# Patient Record
Sex: MALE | Race: WHITE | NOT HISPANIC OR LATINO | Employment: FULL TIME | URBAN - METROPOLITAN AREA
[De-identification: names, ages, dates, MRNs, and addresses within clinical notes are randomized per-mention and may not be internally consistent; named-entity substitution may affect disease eponyms.]

---

## 2017-03-30 ENCOUNTER — APPOINTMENT (OUTPATIENT)
Dept: GENERAL RADIOLOGY | Facility: CLINIC | Age: 32
End: 2017-03-30
Attending: EMERGENCY MEDICINE
Payer: COMMERCIAL

## 2017-03-30 ENCOUNTER — HOSPITAL ENCOUNTER (EMERGENCY)
Facility: CLINIC | Age: 32
Discharge: HOME | End: 2017-03-30
Attending: EMERGENCY MEDICINE | Admitting: EMERGENCY MEDICINE
Payer: COMMERCIAL

## 2017-03-30 ENCOUNTER — PRE VISIT (OUTPATIENT)
Dept: ORTHOPEDICS | Facility: CLINIC | Age: 32
End: 2017-03-30

## 2017-03-30 VITALS
TEMPERATURE: 98.4 F | DIASTOLIC BLOOD PRESSURE: 85 MMHG | HEART RATE: 78 BPM | BODY MASS INDEX: 28.46 KG/M2 | WEIGHT: 240 LBS | RESPIRATION RATE: 16 BRPM | SYSTOLIC BLOOD PRESSURE: 135 MMHG | OXYGEN SATURATION: 99 %

## 2017-03-30 DIAGNOSIS — S52.125A CLOSED NONDISPLACED FRACTURE OF HEAD OF LEFT RADIUS, INITIAL ENCOUNTER: ICD-10-CM

## 2017-03-30 PROCEDURE — 73090 X-RAY EXAM OF FOREARM: CPT | Mod: LT

## 2017-03-30 PROCEDURE — 99284 EMERGENCY DEPT VISIT MOD MDM: CPT

## 2017-03-30 PROCEDURE — 99284 EMERGENCY DEPT VISIT MOD MDM: CPT | Mod: Z6 | Performed by: EMERGENCY MEDICINE

## 2017-03-30 PROCEDURE — 73060 X-RAY EXAM OF HUMERUS: CPT | Mod: LT

## 2017-03-30 PROCEDURE — 73080 X-RAY EXAM OF ELBOW: CPT | Mod: LT

## 2017-03-30 ASSESSMENT — ENCOUNTER SYMPTOMS
ABDOMINAL PAIN: 0
NUMBNESS: 0
WEAKNESS: 0
ARTHRALGIAS: 0
SHORTNESS OF BREATH: 0
FEVER: 0
WOUND: 0

## 2017-03-30 NOTE — ED AVS SNAPSHOT
[facility], [place], Emergency Department    [address]    [place] [address]    Phone:  [phone]    Fax:  [phone]                                       Michael Darden   MRN: [id number]    Department:  [facility], Emergency Department   Date of Visit:  3/30/2017           After Visit Summary Signature Page     I have received my discharge instructions, and my questions have been answered. I have discussed any challenges I see with this plan with the nurse or doctor.    ..........................................................................................................................................  Patient/Patient Representative Signature      ..........................................................................................................................................  Patient Representative Print Name and Relationship to Patient    ..................................................               ................................................  Date                                            Time    ..........................................................................................................................................  Reviewed by Signature/Title    ...................................................              ..............................................  Date                                                            Time

## 2017-03-30 NOTE — TELEPHONE ENCOUNTER
1.  Date/reason for appt: 4/6/17 11AM Closed nondisplaced fracture of head of left radius  2.  Referring provider: BELLA SWANSON   3.  Call to patient (Yes / No - short description): No, ED f/u   4.  Previous care at / records requested from:  ED note- 3/30/17  PACS- HUMERUS XR, Elbow XR & Radius/Ulna 3/30/17

## 2017-03-30 NOTE — DISCHARGE INSTRUCTIONS
Please make an appointment to follow up with Dr. Price in Orthopedics (phone: [phone]) in 1 week.  Wear sling for the next 5 days and after that begin gentle range of motion with flexing and extending the elbow.  Do not lift any weight greater than a glass of water for the next 6 weeks.          Upper Extremity Fracture    You have a break (fracture) of the arm, wrist, or hand. This may be a small crack in the bone. Or it may be a major break, with the broken parts pushed out of position. Most fractures will heal without surgery. But you may need surgery if the bones are far out of place or if the break is near the elbow. Treatment is with a special sling called a shoulder immobilizer, or a splint or cast, depending on where the fracture is. This fracture takes 4 to 6 weeks to heal. The cast may need to be changed in 2 to 3 weeks as swelling goes down.  Home care  Follow these guidelines when caring for yourself:    If you were given a shoulder immobilizer, leave it in place. This will support the injured arm at your side. This is the best position for bone healing. The shoulder immobilizer can be adjusted. If it becomes loose, adjust it so that your forearm is level with the ground (horizontal). Your hand should be level with your elbow.    Put an ice pack on the injured area. Do this for 20 minutes every 1 to 2 hours the first day for pain relief. You can make an ice pack by wrapping a plastic bag of ice cubes in a thin towel. As the ice melts, be careful that the cast/splint/sling doesn t get wet. You can put the ice pack inside the sling and directly over the splint or cast. Continue using the ice pack 3 to 4 times a day for the next 2 days. Then use the ice pack as needed to ease pain and swelling.    Keep the cast/splint/sling completely dry at all times. Bathe with your cast/splint/sling out of the water. Protect it with a large plastic bag, rubber-banded at the top end. If a fiberglass  cast/splint/sling gets wet, you can dry it with a hair dryer.    You may use acetaminophen or ibuprofen to control pain, unless another pain medicine was prescribed. If you have chronic liver or kidney disease, talk with your health care provider before using these medicines. Also talk with your provider if you ve had a stomach ulcer or GI bleeding.    Don t put creams or objects under the cast if you have itching.  Follow-up care  Follow up with your health care provider in 1 week, or as advised. This is to make sure the bone is healing the way it should.  If X-rays were taken, A radiologist will look at them. You will be told of any new findings that may affect your care.  When to seek medical advice  Call your health care provider right away if any of these occur:    The cast cracks    The plaster cast or splint becomes wet or soft    The fiberglass cast or splint stays wet for more than 24 hours    Bad odor from the cast or wound fluid stains the cast    Tightness or pain under the cast or splint gets worse    Fingers become swollen, cold, blue, numb, or tingly    You can t move your fingers    Skin around cast becomes red    Fever of 101 F (38.3 C) or higher, or as directed by your health care provider    1725-6751 [employer]. [address] [place] [address], [place] [address]. All rights reserved. This information is not intended as a substitute for professional medical care. Always follow your healthcare professional's instructions.

## 2017-03-30 NOTE — ED PROVIDER NOTES
History     Chief Complaint   Patient presents with     Arm Injury     Last night fell on the left arm. Pain to the elbow area extending both directions from there.  Not able to straighten the arm.     Patient is a 31 year old male presenting with arm injury.   Arm Injury   Associated symptoms: no fever      Michael Darden is a 31 year old right-hand-dominant male who presents with a left arm injury.  He reports he was playing around with some friends last night and tripped and fell landing on his left forearm and elbow.  He initially had pain at the forearm and had been icing it overnight however this morning noted that he cannot completely flex or extend at his elbow.  He denies any numbness, tingling or weakness in the arm.  He has not had any previous injury to that arm.  He denies hitting his head or any other injury or trauma from the fall itself.    I have reviewed the Medications, Allergies, Past Medical and Surgical History, and Social History in the Epic system.    Review of Systems   Constitutional: Negative for fever.   HENT: Negative for congestion.    Respiratory: Negative for shortness of breath.    Cardiovascular: Negative for chest pain.   Gastrointestinal: Negative for abdominal pain.   Musculoskeletal: Negative for arthralgias.   Skin: Negative for wound.   Neurological: Negative for weakness and numbness.       Physical Exam   BP: 123/78  Heart Rate: 72  Temp: 98  F (36.7  C)  Resp: 18  Weight: 108.9 kg (240 lb)  SpO2: 100 %  Physical Exam   General: patient is alert and oriented and in no acute distress   Head: atraumatic and normocephalic   EENT: moist mucus membranes, pupils round and reactive   Neck: supple   Cardiovascular: regular rate and rhythm, extremities warm and well perfused,  2+ radial pulses  Pulmonary: No respiratory distress  Musculoskeletal: No point bony tenderness of the left upper arm, elbow or forearm, able to extend the left elbow to approximately 10  short of full  extension and flex to approximately 45 , unable to fully supinate the left forearm, full range of motion of the shoulder, wrist and fingers on the left  Neurological: alert and oriented, moving all extremities symmetrically, gait normal, 5 out of 5  strength, finger abduction, wrist extension, elbow flexion and extension, sensation to light touch along the median, radial and ulnar nerves of the hand is intact  Skin: warm, dry, no abrasions or lacerations of the left arm      ED Course     ED Course     Procedures             Critical Care time:  none               Labs Ordered and Resulted from Time of ED Arrival Up to the Time of Departure from the ED - No data to display    Assessments & Plan (with Medical Decision Making)   Mr. Darden is a 31 year old right-hand-dominant male who presents with a left arm injury.  He is neurovascularly intact.  He does not have any point bony tenderness and does have fairly good range of motion though is not able to fully extend his elbow and is approximately 20  short of full extension.  Plain films are obtained of the left elbow, left forearm and left humerus which show impacted radial head fracture.  Discussed with orthopedic surgery and will place in sling with 1 week follow-up.  Instructed to wear sling for 5 days then gentle ROM and no weight bearing for 6 weeks.  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  Will discharge to home with return instructions.        I have reviewed the nursing notes.    I have reviewed the findings, diagnosis, plan and need for follow up with the patient.    There are no discharge medications for this patient.      Final diagnoses:   Closed nondisplaced fracture of head of left radius, initial encounter       3/30/2017   [facility], [place], EMERGENCY DEPARTMENT     Katherine Garrett MD  03/30/17 1355

## 2017-03-30 NOTE — ED AVS SNAPSHOT
[facility], Emergency Department    [address]    [place] [address]    Phone:  [phone]    Fax:  [phone]                                       Michael Darden   MRN: [id number]    Department:  [facility], Emergency Department   Date of Visit:  3/30/2017           Patient Information     Date Of Birth          1985        Your diagnoses for this visit were:     Closed nondisplaced fracture of head of left radius, initial encounter        You were seen by Katherine Garrett MD.        Discharge Instructions       Please make an appointment to follow up with Dr. Price in Orthopedics (phone: [phone]) in 1 week.  Wear sling for the next 5 days and after that begin gentle range of motion with flexing and extending the elbow.  Do not lift any weight greater than a glass of water for the next 6 weeks.          Upper Extremity Fracture    You have a break (fracture) of the arm, wrist, or hand. This may be a small crack in the bone. Or it may be a major break, with the broken parts pushed out of position. Most fractures will heal without surgery. But you may need surgery if the bones are far out of place or if the break is near the elbow. Treatment is with a special sling called a shoulder immobilizer, or a splint or cast, depending on where the fracture is. This fracture takes 4 to 6 weeks to heal. The cast may need to be changed in 2 to 3 weeks as swelling goes down.  Home care  Follow these guidelines when caring for yourself:    If you were given a shoulder immobilizer, leave it in place. This will support the injured arm at your side. This is the best position for bone healing. The shoulder immobilizer can be adjusted. If it becomes loose, adjust it so that your forearm is level with the ground (horizontal). Your hand should be level with your elbow.    Put an ice pack on the injured area. Do this for 20 minutes every 1 to 2 hours the first day for pain relief. You can make an  ice pack by wrapping a plastic bag of ice cubes in a thin towel. As the ice melts, be careful that the cast/splint/sling doesn t get wet. You can put the ice pack inside the sling and directly over the splint or cast. Continue using the ice pack 3 to 4 times a day for the next 2 days. Then use the ice pack as needed to ease pain and swelling.    Keep the cast/splint/sling completely dry at all times. Bathe with your cast/splint/sling out of the water. Protect it with a large plastic bag, rubber-banded at the top end. If a fiberglass cast/splint/sling gets wet, you can dry it with a hair dryer.    You may use acetaminophen or ibuprofen to control pain, unless another pain medicine was prescribed. If you have chronic liver or kidney disease, talk with your health care provider before using these medicines. Also talk with your provider if you ve had a stomach ulcer or GI bleeding.    Don t put creams or objects under the cast if you have itching.  Follow-up care  Follow up with your health care provider in 1 week, or as advised. This is to make sure the bone is healing the way it should.  If X-rays were taken, A radiologist will look at them. You will be told of any new findings that may affect your care.  When to seek medical advice  Call your health care provider right away if any of these occur:    The cast cracks    The plaster cast or splint becomes wet or soft    The fiberglass cast or splint stays wet for more than 24 hours    Bad odor from the cast or wound fluid stains the cast    Tightness or pain under the cast or splint gets worse    Fingers become swollen, cold, blue, numb, or tingly    You can t move your fingers    Skin around cast becomes red    Fever of 101 F (38.3 C) or higher, or as directed by your health care provider    7331-7458 [employer]. [address] [place] [address], [place] [address]. All rights reserved. This information is not intended as a substitute for professional medical care.  Always follow your healthcare professional's instructions.          24 Hour Appointment Hotline       To make an appointment at any [facility] clinic, call [phone] ([phone]). If you don't have a family doctor or clinic, we will help you find one. [facility] clinics are conveniently located to serve the needs of you and your family.             Review of your medicines      Notice     You have not been prescribed any medications.            Procedures and tests performed during your visit     Elbow  XR, G/E 3 views, left    Humerus XR,  G/E 2 views, left    Radius/Ulna XR,  PA &LAT, left      Orders Needing Specimen Collection     None      Pending Results     No orders found from 3/28/2017 to 3/31/2017.            Pending Culture Results     No orders found from 3/28/2017 to 3/31/2017.            Thank you for choosing [facility]       Thank you for choosing [facility] for your care. Our goal is always to provide you with excellent care. Hearing back from our patients is one way we can continue to improve our services. Please take a few minutes to complete the written survey that you may receive in the mail after you visit with us. Thank you!        [employer] Information     [employer] gives you secure access to your electronic health record. If you see a primary care provider, you can also send messages to your care team and make appointments. If you have questions, please call your primary care clinic.  If you do not have a primary care provider, please call [phone] and they will assist you.        Care EveryWhere ID     This is your Care EveryWhere ID. This could be used by other organizations to access your [facility] medical records  [id number]        After Visit Summary       This is your record. Keep this with you and show to your community pharmacist(s) and doctor(s) at your next visit.

## 2017-04-01 ASSESSMENT — ENCOUNTER SYMPTOMS
BACK PAIN: 0
JOINT SWELLING: 0
ARTHRALGIAS: 0
NECK PAIN: 0
MUSCLE WEAKNESS: 0
MYALGIAS: 0
MUSCLE CRAMPS: 0
STIFFNESS: 0

## 2017-04-06 ENCOUNTER — OFFICE VISIT (OUTPATIENT)
Dept: ORTHOPEDICS | Facility: CLINIC | Age: 32
End: 2017-04-06

## 2017-04-06 VITALS
DIASTOLIC BLOOD PRESSURE: 69 MMHG | BODY MASS INDEX: 27.66 KG/M2 | HEIGHT: 78 IN | WEIGHT: 239.1 LBS | HEART RATE: 58 BPM | SYSTOLIC BLOOD PRESSURE: 119 MMHG

## 2017-04-06 DIAGNOSIS — S52.125A CLOSED NONDISPLACED FRACTURE OF HEAD OF LEFT RADIUS, INITIAL ENCOUNTER: Primary | ICD-10-CM

## 2017-04-06 NOTE — NURSING NOTE
"Reason For Visit:   Chief Complaint   Patient presents with     Eval/Assessment     left elbow fx     Primary: none  Ref. 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  Date of injury: 3/29/17  Type of injury: a fall.  Date of surgery: none    Smoker: No    /69  Pulse 58  Ht 1.98 m (6' 5.95\")  Wt 108.5 kg (239 lb 1.6 oz)  BMI 27.66 kg/m2      Pain Assessment  Patient Currently in Pain: Yes  0-10 Pain Scale: 7  Primary Pain Location: Elbow  Pain Orientation: Left  Pain Descriptors: Aching  Alleviating Factors: Other (comment) (tylenol)  Aggravating Factors: Other (comment) (being unable to move)                                                 "

## 2017-04-06 NOTE — MR AVS SNAPSHOT
After Visit Summary   4/6/2017    Michael Darden    MRN: [id number]           Patient Information     Date Of Birth          1985        Visit Information        Provider Department      4/6/2017 11:00 AM Mary Gatica MD [facility]        Today's Diagnoses     Closed nondisplaced fracture of head of left radius, initial encounter    -  1       Follow-ups after your visit        Follow-up notes from your care team     Return in about 2 weeks (around 4/20/2017), or if symptoms worsen or fail to improve.      Who to contact     Please call your clinic at [phone] to:    Ask questions about your health    Make or cancel appointments    Discuss your medicines    Learn about your test results    Speak to your doctor   If you have compliments or concerns about an experience at your clinic, or if you wish to file a complaint, please contact [facility] Patient Relations at [phone] or email us at [email or web address]         Additional Information About Your Visit        MyChart Information     [employer] gives you secure access to your electronic health record. If you see a primary care provider, you can also send messages to your care team and make appointments. If you have questions, please call your primary care clinic.  If you do not have a primary care provider, please call [phone] and they will assist you.      [email or web address] is an electronic gateway that provides easy, online access to your medical records. With [email or web address], you can request a clinic appointment, read your test results, renew a prescription or communicate with your care team.     To access your existing account, please contact your [facility] Clinic or call [phone] for assistance.        Care EveryWhere ID     This is your Care EveryWhere ID. This could be used by other organizations to access your [facility]  "records  [id number]        Your Vitals Were     Pulse Height BMI (Body Mass Index)             58 6' 5.95\" (1.98 m) 27.66 kg/m2          Blood Pressure from Last 3 Encounters:   04/06/17 119/69   03/30/17 135/85   10/11/16 138/85    Weight from Last 3 Encounters:   04/06/17 239 lb 1.6 oz (108.5 kg)   03/30/17 240 lb (108.9 kg)   10/11/16 246 lb 9.6 oz (111.9 kg)               Primary Care Provider    Physician No Ref-Primary       No address on file        Thank you!     Thank you for choosing [facility]  for your care. Our goal is always to provide you with excellent care. Hearing back from our patients is one way we can continue to improve our services. Please take a few minutes to complete the written survey that you may receive in the mail after your visit with us. Thank you!             Your Updated Medication List - Protect others around you: Learn how to safely use, store and throw away your medicines at www.disposemymeds.org.          This list is accurate as of: 4/6/17 12:05 PM.  Always use your most recent med list.                   Brand Name Dispense Instructions for use    TYLENOL EXTRA STRENGTH PO      Take 2 tablets by mouth 2 times daily         "

## 2017-04-06 NOTE — LETTER
"4/6/2017       RE: Michael Darden  [address] [place] [address]     Dear Colleague,    Thank you for referring your patient, Michael Darden, to the [facility] at [facility]. Please see a copy of my visit note below.    SUBJECTIVE: Michael Darden is a right handed 31 year old male who has a left elbow fracture.  Pt was running outside at night, tripped and went down.  Iced.  Woke up in the a.m. And went to the ER.  Told to get f/u pictures and keep the arm in a sling.  Aches constantly.  Keeping it in the sling.  Went down onto left elbow and went down onto cement.  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    PAST MEDICAL, SOCIAL, SURGICAL AND FAMILY HISTORY: He  has no past medical history on file.  He  has a past surgical history that includes deviated septum.  His family history includes CANCER in his mother; Coronary Artery Disease in his father.  He reports that he has never smoked. He has never used smokeless tobacco. He reports that he drinks alcohol. He reports that he does not use illicit drugs.      ALLERGIES: He has No Known Allergies.    CURRENT MEDICATIONS: He has a current medication list which includes the following prescription(s): acetaminophen.     REVIEW OF SYSTEMS: 10 point review of systems is negative except as noted above.    EXAM:  /69  Pulse 58  Ht 6' 5.95\" (1.98 m)  Wt 239 lb 1.6 oz (108.5 kg)  BMI 27.66 kg/m2  CONSTITUTIONIAL: healthy, alert, no distress, cooperative and over weight  HEAD: Normocephalic. No masses, lesions, tenderness or abnormalities  ENT: ENT exam normal, no neck nodes or sinus tenderness  SKIN: no suspicious lesions or rashes  GAIT: normal  Stance: normal  NEUROLOGIC: Normal muscle tone and strength, reflexes normal, sensation grossly normal.  PSYCHIATRIC: affect normal/bright and mentation appears normal.    MUSCULOSKELETAL: left elbow pain  Inspection: no swelling, no ecchymosis, no distal " bicep tendon defect  Tender: radial head/neck  Non-tender: lateral epicondyle, common extensor tendon, medial epicondyle, common flexor tendon, extensor muscle of forearm and flexor muscle of forearm  Range of Motion: supination:  Lacks 10 degrees degrees, pronation: normal  Strength: elbow strength mildly decreased on left  Special tests: normal stability        ASSESSMENT/PLAN:  Pt is a 32 yo white male with PMHx of deviated septum repair presenting with left elbow injury after a fall  1. Left elbow radial head fracture- sling, gentle ROM, return to clinic in 2 weeks, re-x-ray left elbow 3v    X-RAY INTERPRETATION:   X-Ray of the Left Elbow: 3-view, ap, lateral, oblique:  ordered and interpreted in the office today was positive for IMPRESSION: Redemonstration of a subtle nondisplaced fracture  involving the left elbow radial head.      Again, thank you for allowing me to participate in the care of your patient.      Sincerely,    Mary Gatica MD

## 2017-04-06 NOTE — PROGRESS NOTES
"SUBJECTIVE: Michael Darden is a right handed 31 year old male who has a left elbow fracture.  Pt was running outside at night, tripped and went down.  Iced.  Woke up in the a.m. And went to the ER.  Told to get f/u pictures and keep the arm in a sling.  Aches constantly.  Keeping it in the sling.  Went down onto left elbow and went down onto cement.  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    PAST MEDICAL, SOCIAL, SURGICAL AND FAMILY HISTORY: He  has no past medical history on file.  He  has a past surgical history that includes deviated septum.  His family history includes CANCER in his mother; Coronary Artery Disease in his father.  He reports that he has never smoked. He has never used smokeless tobacco. He reports that he drinks alcohol. He reports that he does not use illicit drugs.      ALLERGIES: He has No Known Allergies.    CURRENT MEDICATIONS: He has a current medication list which includes the following prescription(s): acetaminophen.     REVIEW OF SYSTEMS: 10 point review of systems is negative except as noted above.    EXAM:  /69  Pulse 58  Ht 6' 5.95\" (1.98 m)  Wt 239 lb 1.6 oz (108.5 kg)  BMI 27.66 kg/m2  CONSTITUTIONIAL: healthy, alert, no distress, cooperative and over weight  HEAD: Normocephalic. No masses, lesions, tenderness or abnormalities  ENT: ENT exam normal, no neck nodes or sinus tenderness  SKIN: no suspicious lesions or rashes  GAIT: normal  Stance: normal  NEUROLOGIC: Normal muscle tone and strength, reflexes normal, sensation grossly normal.  PSYCHIATRIC: affect normal/bright and mentation appears normal.    MUSCULOSKELETAL: left elbow pain  Inspection: no swelling, no ecchymosis, no distal bicep tendon defect  Tender: radial head/neck  Non-tender: lateral epicondyle, common extensor tendon, medial epicondyle, common flexor tendon, extensor muscle of forearm and flexor muscle of forearm  Range of Motion: supination:  Lacks 10 degrees degrees, pronation: normal  Strength: " elbow strength mildly decreased on left  Special tests: normal stability        ASSESSMENT/PLAN:  Pt is a 32 yo white male with PMHx of deviated septum repair presenting with left elbow injury after a fall  1. Left elbow radial head fracture- sling, gentle ROM, return to clinic in 2 weeks, re-x-ray left elbow 3v    X-RAY INTERPRETATION:   X-Ray of the Left Elbow: 3-view, ap, lateral, oblique:  ordered and interpreted in the office today was positive for IMPRESSION: Redemonstration of a subtle nondisplaced fracture  involving the left elbow radial head.  Answers for HPI/ROS submitted by the patient on 4/1/2017   General Symptoms: No  Skin Symptoms: No  HENT Symptoms: No  EYE SYMPTOMS: No  HEART SYMPTOMS: No  LUNG SYMPTOMS: No  INTESTINAL SYMPTOMS: No  URINARY SYMPTOMS: No  REPRODUCTIVE SYMPTOMS: No  SKELETAL SYMPTOMS: Yes  BLOOD SYMPTOMS: No  NERVOUS SYSTEM SYMPTOMS: No  MENTAL HEALTH SYMPTOMS: No  Back pain: No  Muscle aches: No  Neck pain: No  Swollen joints: No  Joint pain: No  Bone pain: No  Muscle cramps: No  Muscle weakness: No  Joint stiffness: No  Bone fracture: Yes

## 2017-04-20 ENCOUNTER — OFFICE VISIT (OUTPATIENT)
Dept: ORTHOPEDICS | Facility: CLINIC | Age: 32
End: 2017-04-20

## 2017-04-20 VITALS
WEIGHT: 239.8 LBS | BODY MASS INDEX: 27.74 KG/M2 | RESPIRATION RATE: 18 BRPM | DIASTOLIC BLOOD PRESSURE: 76 MMHG | HEART RATE: 69 BPM | SYSTOLIC BLOOD PRESSURE: 121 MMHG | HEIGHT: 78 IN | OXYGEN SATURATION: 98 %

## 2017-04-20 DIAGNOSIS — S52.125K CLOSED NONDISPLACED FRACTURE OF HEAD OF LEFT RADIUS WITH NONUNION, SUBSEQUENT ENCOUNTER: Primary | ICD-10-CM

## 2017-04-20 DIAGNOSIS — S52.125A CLOSED NONDISPLACED FRACTURE OF HEAD OF LEFT RADIUS: Primary | ICD-10-CM

## 2017-04-20 NOTE — MR AVS SNAPSHOT
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      Who to contact     Please call your clinic at [phone] to:    Ask questions about your health    Make or cancel appointments    Discuss your medicines    Learn about your test results    Speak to your doctor   If you have compliments or concerns about an experience at your clinic, or if you wish to file a complaint, please contact [facility] Patient Relations at [phone] or email us at [email or web address]         Additional Information About Your Visit        MyChart Information     Pittarellot gives you secure access to your electronic health record. If you see a primary care provider, you can also send messages to your care team and make appointments. If you have questions, please call your primary care clinic.  If you do not have a primary care provider, please call [phone] and they will assist you.      LogicLadder is an electronic gateway that provides easy, online access to your medical records. With LogicLadder, you can request a clinic appointment, read your test results, renew a prescription or communicate with your care team.     To access your existing account, please contact your [facility] or call [phone] for assistance.        Care EveryWhere ID     This is your Care EveryWhere ID. This could be used by other organizations to access your [facility]  "records  [id number]        Your Vitals Were     Pulse Respirations Height Pulse Oximetry BMI (Body Mass Index)       69 18 1.981 m (6' 6\") 98% 27.71 kg/m2        Blood Pressure from Last 3 Encounters:   04/20/17 121/76   04/06/17 119/69   03/30/17 135/85    Weight from Last 3 Encounters:   04/20/17 108.8 kg (239 lb 12.8 oz)   04/06/17 108.5 kg (239 lb 1.6 oz)   03/30/17 108.9 kg (240 lb)              Today, you had the following     No orders found for display       Primary Care Provider    Physician No Ref-Primary       No address on file        Thank you!     Thank you for choosing [facility]  for your care. Our goal is always to provide you with excellent care. Hearing back from our patients is one way we can continue to improve our services. Please take a few minutes to complete the written survey that you may receive in the mail after your visit with us. Thank you!             Your Updated Medication List - Protect others around you: Learn how to safely use, store and throw away your medicines at www.disposemymeds.org.          This list is accurate as of: 4/20/17  1:48 PM.  Always use your most recent med list.                   Brand Name Dispense Instructions for use    TYLENOL EXTRA STRENGTH PO      Take 2 tablets by mouth 2 times daily         "

## 2017-04-20 NOTE — LETTER
"4/20/2017       RE: Michael Darden  [address] [place] [address]     Dear Colleague,    Thank you for referring your patient, Michael Darden, to the [facility] at [facility]. Please see a copy of my visit note below.    SUBJECTIVE: Michael Darden is a right handed 31 year old male who has a left elbow fracture.  Pt was running outside at night, tripped and went down.    Pt reports that he's doing well, out of the sling.  Pt reports that he really doesn't have much pain at the elbow.  Pt reports full pronation and supination, some loss coming up like making a bicep.  More of a stiffness at the elbow.    PAST MEDICAL, SOCIAL, SURGICAL AND FAMILY HISTORY: He  has no past medical history on file.  He  has a past surgical history that includes deviated septum.  His family history includes Breast Cancer in his mother; CANCER in his mother; Coronary Artery Disease in his father; Hypertension in his father.  He reports that he has never smoked. He has never used smokeless tobacco. He reports that he drinks alcohol. He reports that he does not use illicit drugs.      ALLERGIES: He has No Known Allergies.    CURRENT MEDICATIONS: He has a current medication list which includes the following prescription(s): acetaminophen.     REVIEW OF SYSTEMS: 10 point review of systems is negative except as noted above.    EXAM:  /76 (BP Location: Right arm, Patient Position: Chair, Cuff Size: Adult Large)  Pulse 69  Resp 18  Ht 1.981 m (6' 6\")  Wt 108.8 kg (239 lb 12.8 oz)  SpO2 98%  BMI 27.71 kg/m2  CONSTITUTIONIAL: healthy, alert, no distress, cooperative and over weight  HEAD: Normocephalic. No masses, lesions, tenderness or abnormalities  ENT: ENT exam normal, no neck nodes or sinus tenderness  SKIN: no suspicious lesions or rashes  GAIT: normal  Stance: normal  NEUROLOGIC: Normal muscle tone and strength, reflexes normal, sensation grossly " normal.  PSYCHIATRIC: affect normal/bright and mentation appears normal.    MUSCULOSKELETAL: left elbow pain  Inspection: no swelling, no ecchymosis, no distal bicep tendon defect  Tender: radial head/neck  Non-tender: lateral epicondyle, common extensor tendon, medial epicondyle, common flexor tendon, extensor muscle of forearm and flexor muscle of forearm  Range of Motion: supination:  Lacks 5 degrees degrees, pronation: normal, flexion: lacks 20 degrees  Strength: elbow strength mildly decreased on left  Special tests: normal stability        ASSESSMENT/PLAN:  Pt is a 30 yo white male with PMHx of deviated septum repair presenting with left elbow injury after a fall  1. Left elbow radial head fracture- out of sling, gentle ROM, return to clinic in 3 weeks, re-x-ray left elbow 3v    X-RAY INTERPRETATION:   X-Ray of the Left Elbow: 3-view, ap, lateral, oblique:  ordered and interpreted in the office today was positive for IMPRESSION: Redemonstration of a subtle nondisplaced fracture  involving the left elbow radial head.        Again, thank you for allowing me to participate in the care of your patient.      Sincerely,    Mary Gatica MD

## 2017-04-20 NOTE — PROGRESS NOTES
"SUBJECTIVE: Michael Darden is a right handed 31 year old male who has a left elbow fracture.  Pt was running outside at night, tripped and went down.    Pt reports that he's doing well, out of the sling.  Pt reports that he really doesn't have much pain at the elbow.  Pt reports full pronation and supination, some loss coming up like making a bicep.  More of a stiffness at the elbow.    PAST MEDICAL, SOCIAL, SURGICAL AND FAMILY HISTORY: He  has no past medical history on file.  He  has a past surgical history that includes deviated septum.  His family history includes Breast Cancer in his mother; CANCER in his mother; Coronary Artery Disease in his father; Hypertension in his father.  He reports that he has never smoked. He has never used smokeless tobacco. He reports that he drinks alcohol. He reports that he does not use illicit drugs.      ALLERGIES: He has No Known Allergies.    CURRENT MEDICATIONS: He has a current medication list which includes the following prescription(s): acetaminophen.     REVIEW OF SYSTEMS: 10 point review of systems is negative except as noted above.    EXAM:  /76 (BP Location: Right arm, Patient Position: Chair, Cuff Size: Adult Large)  Pulse 69  Resp 18  Ht 1.981 m (6' 6\")  Wt 108.8 kg (239 lb 12.8 oz)  SpO2 98%  BMI 27.71 kg/m2  CONSTITUTIONIAL: healthy, alert, no distress, cooperative and over weight  HEAD: Normocephalic. No masses, lesions, tenderness or abnormalities  ENT: ENT exam normal, no neck nodes or sinus tenderness  SKIN: no suspicious lesions or rashes  GAIT: normal  Stance: normal  NEUROLOGIC: Normal muscle tone and strength, reflexes normal, sensation grossly normal.  PSYCHIATRIC: affect normal/bright and mentation appears normal.    MUSCULOSKELETAL: left elbow pain  Inspection: no swelling, no ecchymosis, no distal bicep tendon defect  Tender: radial head/neck  Non-tender: lateral epicondyle, common extensor tendon, medial epicondyle, common flexor tendon, " extensor muscle of forearm and flexor muscle of forearm  Range of Motion: supination:  Lacks 5 degrees degrees, pronation: normal, flexion: lacks 20 degrees  Strength: elbow strength mildly decreased on left  Special tests: normal stability        ASSESSMENT/PLAN:  Pt is a 32 yo white male with PMHx of deviated septum repair presenting with left elbow injury after a fall  1. Left elbow radial head fracture- out of sling, gentle ROM, return to clinic in 3 weeks, re-x-ray left elbow 3v    X-RAY INTERPRETATION:   X-Ray of the Left Elbow: 3-view, ap, lateral, oblique:  ordered and interpreted in the office today was positive for IMPRESSION: Redemonstration of a subtle nondisplaced fracture  involving the left elbow radial head.    Answers for HPI/ROS submitted by the patient on 4/18/2017   General Symptoms: No  Skin Symptoms: No  HENT Symptoms: No  EYE SYMPTOMS: No  HEART SYMPTOMS: No  LUNG SYMPTOMS: No  INTESTINAL SYMPTOMS: No  URINARY SYMPTOMS: No  REPRODUCTIVE SYMPTOMS: No  SKELETAL SYMPTOMS: No  BLOOD SYMPTOMS: No  NERVOUS SYSTEM SYMPTOMS: No  MENTAL HEALTH SYMPTOMS: No

## 2017-04-20 NOTE — NURSING NOTE
"Reason For Visit:   Chief Complaint   Patient presents with     Elbow left     f/u left elbow fx, DOI: 3/29/2017       Primary: none  Ref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  Date of injury: 3/29/17  Type of injury: a fall.  Date of surgery: none  Smoker: No    /76 (BP Location: Right arm, Patient Position: Chair, Cuff Size: Adult Large)  Pulse 69  Resp 18  Ht 1.981 m (6' 6\")  Wt 108.8 kg (239 lb 12.8 oz)  SpO2 98%  BMI 27.71 kg/m2    Pain Assessment  Patient Currently in Pain: Yes  0-10 Pain Scale: 3  Primary Pain Location: Elbow  Pain Orientation: Left  Pain Descriptors: Aching  Alleviating Factors: Ice  Aggravating Factors: Other (comment) (\"over use\")    "

## 2018-10-31 ENCOUNTER — OFFICE VISIT (OUTPATIENT)
Dept: FAMILY MEDICINE | Facility: CLINIC | Age: 33
End: 2018-10-31
Payer: COMMERCIAL

## 2018-10-31 VITALS
OXYGEN SATURATION: 100 % | SYSTOLIC BLOOD PRESSURE: 110 MMHG | HEART RATE: 68 BPM | RESPIRATION RATE: 20 BRPM | WEIGHT: 242 LBS | HEIGHT: 78 IN | TEMPERATURE: 97.9 F | DIASTOLIC BLOOD PRESSURE: 71 MMHG | BODY MASS INDEX: 28 KG/M2

## 2018-10-31 DIAGNOSIS — B96.89 BACTERIAL CONJUNCTIVITIS OF BOTH EYES: ICD-10-CM

## 2018-10-31 DIAGNOSIS — J02.9 SORE THROAT: Primary | ICD-10-CM

## 2018-10-31 DIAGNOSIS — Z23 NEED FOR VACCINATION: ICD-10-CM

## 2018-10-31 DIAGNOSIS — H10.9 BACTERIAL CONJUNCTIVITIS OF BOTH EYES: ICD-10-CM

## 2018-10-31 DIAGNOSIS — J02.0 STREPTOCOCCAL SORE THROAT: ICD-10-CM

## 2018-10-31 LAB
DEPRECATED S PYO AG THROAT QL EIA: NORMAL
SPECIMEN SOURCE: NORMAL

## 2018-10-31 PROCEDURE — 99213 OFFICE O/P EST LOW 20 MIN: CPT | Mod: 25 | Performed by: NURSE PRACTITIONER

## 2018-10-31 PROCEDURE — 90686 IIV4 VACC NO PRSV 0.5 ML IM: CPT | Performed by: NURSE PRACTITIONER

## 2018-10-31 PROCEDURE — 87081 CULTURE SCREEN ONLY: CPT | Performed by: NURSE PRACTITIONER

## 2018-10-31 PROCEDURE — 90471 IMMUNIZATION ADMIN: CPT | Performed by: NURSE PRACTITIONER

## 2018-10-31 PROCEDURE — 87880 STREP A ASSAY W/OPTIC: CPT | Performed by: NURSE PRACTITIONER

## 2018-10-31 RX ORDER — POLYMYXIN B SULFATE AND TRIMETHOPRIM 1; 10000 MG/ML; [USP'U]/ML
1 SOLUTION OPHTHALMIC
Qty: 1 BOTTLE | Refills: 0 | Status: SHIPPED | OUTPATIENT
Start: 2018-10-31 | End: 2018-11-05

## 2018-10-31 NOTE — NURSING NOTE
Injectable Influenza Immunization Documentation    1.  Has the patient received the information for the injectable influenza vaccine? YES     2. Is the patient 6 months of age or older? YES     3. Does the patient have any of the following contraindications?         Severe allergy to eggs? No     Severe allergic reaction to previous influenza vaccines? No   Severe allergy to latex? No       History of Guillain-[place] syndrome? No     Currently have a temperature greater than 100.4F? No         Vaccination given by Phan Coon MA

## 2018-10-31 NOTE — MR AVS SNAPSHOT
"              After Visit Summary   10/31/2018    Michael Darden    MRN: [id number]           Patient Information     Date Of Birth          1985        Visit Information        Provider Department      10/31/2018 7:40 AM Morenita Sher APRN CNP [facility]        Today's Diagnoses     Sore throat    -  1    Streptococcal sore throat        Bacterial conjunctivitis of both eyes        Need for vaccination           Follow-ups after your visit        Who to contact     If you have questions or need follow up information about today's clinic visit or your schedule please contact [facility] directly at [phone].  Normal or non-critical lab and imaging results will be communicated to you by gBoxhart, letter or phone within 4 business days after the clinic has received the results. If you do not hear from us within 7 days, please contact the clinic through gBoxhart or phone. If you have a critical or abnormal lab result, we will notify you by phone as soon as possible.  Submit refill requests through SimilarWeb or call your pharmacy and they will forward the refill request to us. Please allow 3 business days for your refill to be completed.          Additional Information About Your Visit        MyChart Information     SimilarWeb gives you secure access to your electronic health record. If you see a primary care provider, you can also send messages to your care team and make appointments. If you have questions, please call your primary care clinic.  If you do not have a primary care provider, please call [phone] and they will assist you.        Care EveryWhere ID     This is your Care EveryWhere ID. This could be used by other organizations to access your [facility] medical records  [id number]        Your Vitals Were     Pulse Temperature Respirations Height Pulse Oximetry BMI (Body Mass Index)    68 97.9  F (36.6  C) (Oral) 20 6' 6\" (1.981 m) 100% 27.97 kg/m2       " Blood Pressure from Last 3 Encounters:   10/31/18 110/71   04/20/17 121/76   04/06/17 119/69    Weight from Last 3 Encounters:   10/31/18 242 lb (109.8 kg)   04/20/17 239 lb 12.8 oz (108.8 kg)   04/06/17 239 lb 1.6 oz (108.5 kg)              We Performed the Following     ADMIN 1st VACCINE     Beta strep group A culture     HC FLU VAC PRESRV FREE QUAD SPLIT VIR 3+YRS IM     Strep, Rapid Screen          Today's Medication Changes          These changes are accurate as of 10/31/18  9:08 AM.  If you have any questions, ask your nurse or doctor.               Start taking these medicines.        Dose/Directions    trimethoprim-polymyxin b ophthalmic solution   Commonly known as:  POLYTRIM   Used for:  Bacterial conjunctivitis of both eyes   Started by:  Morenita Sher APRN CNP        Dose:  1 drop   Apply 1 drop to eye every 3 hours for 7 days   Quantity:  1 Bottle   Refills:  0            Where to get your medicines      These medications were sent to [facility] - [place], [place] - [address]  [address], [place] [address]     Phone:  [phone]     trimethoprim-polymyxin b ophthalmic solution                Primary Care Provider Fax #    Physician No Ref-Primary [phone]       No address on file        Equal Access to Services     GERALD VALLECILLO AH: Humberto lizarragao Soomaali, waaxda luqadaha, qaybta kaalmada adeegyada, waxagnieszka preciado. So [facility] [phone].    ATENCIÓN: Si habla español, tiene a welsh disposición servicios gratuitos de asistencia lingüística. Llame al [phone].    We comply with applicable federal civil rights laws and [place] laws. We do not discriminate on the basis of race, color, national origin, age, disability, sex, sexual orientation, or gender identity.            Thank you!     Thank you for choosing [facility]  for your care. Our goal is always to provide you with excellent care. Hearing back from our  patients is one way we can continue to improve our services. Please take a few minutes to complete the written survey that you may receive in the mail after your visit with us. Thank you!             Your Updated Medication List - Protect others around you: Learn how to safely use, store and throw away your medicines at www.disposemymeds.org.          This list is accurate as of 10/31/18  9:08 AM.  Always use your most recent med list.                   Brand Name Dispense Instructions for use Diagnosis    trimethoprim-polymyxin b ophthalmic solution    POLYTRIM    1 Bottle    Apply 1 drop to eye every 3 hours for 7 days    Bacterial conjunctivitis of both eyes       TYLENOL EXTRA STRENGTH PO      Take 2 tablets by mouth 2 times daily

## 2018-10-31 NOTE — PROGRESS NOTES
"  SUBJECTIVE:   Michael Darden is a 33 year old male who presents to clinic today for the following health issues:      RESPIRATORY SYMPTOMS      Duration: three days     Description  sore throat and eye redness     Severity: moderate    Accompanying signs and symptoms: None    History (predisposing factors):  Child had hand foot mouth     Precipitating or alleviating factors: None    Therapies tried and outcome:  none    Reviewed and updated as needed this visit by clinical staff       Reviewed and updated as needed this visit by Provider         ROS:  Constitutional, HEENT, cardiovascular, pulmonary, GI, , musculoskeletal, neuro, skin, endocrine and psych systems are negative, except as otherwise noted.    OBJECTIVE:     /71  Pulse 68  Temp 97.9  F (36.6  C) (Oral)  Resp 20  Ht 6' 6\" (1.981 m)  Wt 242 lb (109.8 kg)  SpO2 100%  BMI 27.97 kg/m2  Body mass index is 27.97 kg/(m^2).  GENERAL: healthy, alert and no distress  EYES: Eyes grossly normal to inspection, PERRL and bilateral conjunctivae erythematous and injected with gold crusting in lashes   ENT: ear canals and TM's normal, nose and mouth without ulcers or lesions  NECK: no adenopathy, no asymmetry, masses, or scars and thyroid normal to palpation  RESP: lungs clear to auscultation - no rales, rhonchi or wheezes  CV: regular rate and rhythm, normal S1 S2, no S3 or S4, no murmur, click or rub, no peripheral edema and peripheral pulses strong  MS: no gross musculoskeletal defects noted, no edema  SKIN: no suspicious lesions or rashes    Diagnostic Test Results:  Results for orders placed or performed in visit on 10/31/18 (from the past 24 hour(s))   Strep, Rapid Screen   Result Value Ref Range    Specimen Description Throat     Rapid Strep A Screen       NEGATIVE: No Group A streptococcal antigen detected by immunoassay, await culture report.       ASSESSMENT/PLAN:       ICD-10-CM    1. Need for vaccination Z23 ADMIN 1st VACCINE     HC FLU VAC " PRESRV FREE QUAD SPLIT VIR 3+YRS IM   2. Streptococcal sore throat J02.0 Strep, Rapid Screen     Beta strep group A culture   3. Bacterial conjunctivitis of both eyes H10.9 trimethoprim-polymyxin b (POLYTRIM) ophthalmic solution     The RST was negative.  Culture pending.    push fluids, rest as able.  lots of handwashing.    Antibiotic drops per order. Hygiene discussed. Call prn    See Patient Instructions    CLEO Mane CNP  [facility]

## 2018-11-01 LAB
BACTERIA SPEC CULT: NORMAL
SPECIMEN SOURCE: NORMAL

## 2018-11-05 ENCOUNTER — OFFICE VISIT (OUTPATIENT)
Dept: FAMILY MEDICINE | Facility: CLINIC | Age: 33
End: 2018-11-05
Payer: COMMERCIAL

## 2018-11-05 VITALS — RESPIRATION RATE: 16 BRPM | HEART RATE: 68 BPM | WEIGHT: 242 LBS | BODY MASS INDEX: 27.97 KG/M2

## 2018-11-05 DIAGNOSIS — J06.9 UPPER RESPIRATORY TRACT INFECTION, UNSPECIFIED TYPE: Primary | ICD-10-CM

## 2018-11-05 PROCEDURE — 99213 OFFICE O/P EST LOW 20 MIN: CPT | Performed by: FAMILY MEDICINE

## 2018-11-05 NOTE — PROGRESS NOTES
SUBJECTIVE:   Michael Darden is a 33 year old male who presents to clinic today for the following health issues:    HPI     Presents for Follow-up after starting Polytrim eye drops last W for B conjunctivitis.  Since that time symptoms have only worsened with. increased upper lid swelling B L>>R.  Continues to have injected conjunctivae and sclerae.  Continues with URI.    Multiple sick contacts.  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  Wife with recent hand-foot-mouth disease.  Samll kids at home who have not been sick.    Otherwise typically healthy.    Problem list and histories reviewed & adjusted, as indicated.  Additional history: as documented        There is no problem list on file for this patient.    Past Surgical History:   Procedure Laterality Date     deviated septum         Social History   Substance Use Topics     Smoking status: Never Smoker     Smokeless tobacco: Never Used     Alcohol use 0.0 oz/week      Comment: 4 -5  drink a week     Family History   Problem Relation Age of Onset     Cancer Mother      Breast Cancer Mother      Coronary Artery Disease Father      Hypertension Father          Current Outpatient Prescriptions   Medication Sig Dispense Refill     amoxicillin-clavulanate (AUGMENTIN) 875-125 MG per tablet Take 1 tablet by mouth 2 times daily 20 tablet 0     Acetaminophen (TYLENOL EXTRA STRENGTH PO) Take 2 tablets by mouth 2 times daily       Allergies   Allergen Reactions     Polytrim [Polymyxin B-Trimethoprim]      Worsening conjunctivitis with use     Recent Labs   Lab Test  05/14/15   0938   LDL  67   HDL  55   TRIG  44      BP Readings from Last 3 Encounters:   10/31/18 110/71   04/20/17 121/76   04/06/17 119/69    Wt Readings from Last 3 Encounters:   11/05/18 242 lb (109.8 kg)   10/31/18 242 lb (109.8 kg)   04/20/17 239 lb 12.8 oz (108.8 kg)         ROS:  Constitutional, HEENT, cardiovascular, pulmonary, gi and gu systems are negative, except as otherwise  noted.    OBJECTIVE:     Pulse 68  Resp 16  Wt 242 lb (109.8 kg)  BMI 27.97 kg/m2  Body mass index is 27.97 kg/(m^2).  GENERAL: healthy, alert and no distress  EYES:  PERRL and conjunctivae and sclerae injected bilaterally with upper lid edema L>>>R, clear drainage noted.  HENT: ear canals and TM's normal, nose and mouth without ulcers or lesions  NECK: no adenopathy, no asymmetry, masses, or scars and thyroid normal to palpation  RESP: lungs clear to auscultation - no rales, rhonchi or wheezes  CV: regular rate and rhythm, normal S1 S2, no S3 or S4, no murmur, click or rub, no peripheral edema and peripheral pulses strong  ABDOMEN: soft, nontender, no hepatosplenomegaly, no masses and bowel sounds normal  MS: no gross musculoskeletal defects noted, no edema  PSYCH: mentation appears normal, affect normal/bright    ASSESSMENT/PLAN:     1. Upper respiratory tract infection, unspecified type    - amoxicillin-clavulanate (AUGMENTIN) 875-125 MG per tablet; Take 1 tablet by mouth 2 times daily  Dispense: 20 tablet; Refill: 0    Recommend discontinuing the Polytrim immediately and changing to something like artificial tears to help washout the Polytrim and keep moisturized.  Will start Augmentin bid x 10 days with close Follow-up if no change or worsening symptoms.  To call in next 24-48 hours if eye symptoms not improving-- will change to EES drops/ointment prn.    Marti Gibson MD  [facility]

## 2018-11-05 NOTE — MR AVS SNAPSHOT
After Visit Summary   11/5/2018    Michael Darden    MRN: [id number]           Patient Information     Date Of Birth          1985        Visit Information        Provider Department      11/5/2018 7:20 AM Marti Gibson MD [facility]        Today's Diagnoses     Upper respiratory tract infection, unspecified type    -  1       Follow-ups after your visit        Follow-up notes from your care team     Return in about 2 days (around 11/7/2018), or if symptoms worsen or fail to improve.      Who to contact     If you have questions or need follow up information about today's clinic visit or your schedule please contact [facility] directly at [phone].  Normal or non-critical lab and imaging results will be communicated to you by [employer], letter or phone within 4 business days after the clinic has received the results. If you do not hear from us within 7 days, please contact the clinic through [employer] or phone. If you have a critical or abnormal lab result, we will notify you by phone as soon as possible.  Submit refill requests through [employer] or call your pharmacy and they will forward the refill request to us. Please allow 3 business days for your refill to be completed.          Additional Information About Your Visit        MyChart Information     [employer] gives you secure access to your electronic health record. If you see a primary care provider, you can also send messages to your care team and make appointments. If you have questions, please call your primary care clinic.  If you do not have a primary care provider, please call [phone] and they will assist you.        Care EveryWhere ID     This is your Care EveryWhere ID. This could be used by other organizations to access your [facility] medical records  [id number]        Your Vitals Were     Pulse Respirations BMI (Body Mass Index)             68 16 27.97 kg/m2           Blood Pressure from Last 3 Encounters:   10/31/18 110/71   04/20/17 121/76   04/06/17 119/69    Weight from Last 3 Encounters:   11/05/18 242 lb (109.8 kg)   10/31/18 242 lb (109.8 kg)   04/20/17 239 lb 12.8 oz (108.8 kg)              Today, you had the following     No orders found for display         Today's Medication Changes          These changes are accurate as of 11/5/18 12:42 PM.  If you have any questions, ask your nurse or doctor.               Start taking these medicines.        Dose/Directions    amoxicillin-clavulanate 875-125 MG per tablet   Commonly known as:  AUGMENTIN   Used for:  Upper respiratory tract infection, unspecified type        Dose:  1 tablet   Take 1 tablet by mouth 2 times daily   Quantity:  20 tablet   Refills:  0         Stop taking these medicines if you haven't already. Please contact your care team if you have questions.     trimethoprim-polymyxin b ophthalmic solution   Commonly known as:  POLYTRIM                Where to get your medicines      These medications were sent to [facility] - [place], [place] - [address]  [address], [place] [address]     Phone:  [phone]     amoxicillin-clavulanate 875-125 MG per tablet                Primary Care Provider Fax #    Physician No Ref-Primary [phone]       No address on file        Equal Access to Services     GERALD VALLECILLO AH: Hadii caleb lizarragao Sodamir, waaxda luqadaha, qaybta kaalmada adeegyada, adonis preciado. So [facility] [phone].    ATENCIÓN: Si habla español, tiene a welsh disposición servicios gratuitos de asistencia lingüística. Llame al [phone].    We comply with applicable federal civil rights laws and [place] laws. We do not discriminate on the basis of race, color, national origin, age, disability, sex, sexual orientation, or gender identity.            Thank you!     Thank you for choosing [facility]  for your care. Our goal is  always to provide you with excellent care. Hearing back from our patients is one way we can continue to improve our services. Please take a few minutes to complete the written survey that you may receive in the mail after your visit with us. Thank you!             Your Updated Medication List - Protect others around you: Learn how to safely use, store and throw away your medicines at www.disposemymeds.org.          This list is accurate as of 11/5/18 12:42 PM.  Always use your most recent med list.                   Brand Name Dispense Instructions for use Diagnosis    amoxicillin-clavulanate 875-125 MG per tablet    AUGMENTIN    20 tablet    Take 1 tablet by mouth 2 times daily    Upper respiratory tract infection, unspecified type       TYLENOL EXTRA STRENGTH PO      Take 2 tablets by mouth 2 times daily

## 2019-10-14 ENCOUNTER — IMMUNIZATION (OUTPATIENT)
Dept: NURSING | Facility: CLINIC | Age: 34
End: 2019-10-14
Payer: COMMERCIAL

## 2019-10-14 DIAGNOSIS — Z23 NEED FOR PROPHYLACTIC VACCINATION AND INOCULATION AGAINST INFLUENZA: Primary | ICD-10-CM

## 2019-10-14 PROCEDURE — 99207 ZZC NO CHARGE NURSE ONLY: CPT

## 2019-10-14 PROCEDURE — 90686 IIV4 VACC NO PRSV 0.5 ML IM: CPT

## 2019-10-14 PROCEDURE — 90471 IMMUNIZATION ADMIN: CPT

## 2020-11-29 ENCOUNTER — HEALTH MAINTENANCE LETTER (OUTPATIENT)
Age: 35
End: 2020-11-29

## 2021-05-18 ENCOUNTER — OFFICE VISIT (OUTPATIENT)
Dept: URGENT CARE | Facility: URGENT CARE | Age: 36
End: 2021-05-18
Payer: COMMERCIAL

## 2021-05-18 ENCOUNTER — APPOINTMENT (OUTPATIENT)
Dept: URGENT CARE | Facility: CLINIC | Age: 36
End: 2021-05-18
Payer: COMMERCIAL

## 2021-05-18 VITALS
TEMPERATURE: 98.1 F | SYSTOLIC BLOOD PRESSURE: 124 MMHG | BODY MASS INDEX: 27.73 KG/M2 | OXYGEN SATURATION: 99 % | HEART RATE: 59 BPM | DIASTOLIC BLOOD PRESSURE: 79 MMHG | WEIGHT: 240 LBS

## 2021-05-18 DIAGNOSIS — K13.79 MOUTH SORES: Primary | ICD-10-CM

## 2021-05-18 PROCEDURE — 99213 OFFICE O/P EST LOW 20 MIN: CPT | Performed by: FAMILY MEDICINE

## 2021-05-18 NOTE — PROGRESS NOTES
Chief Complaint   Patient presents with     Urgent Care     Sore     c/o sores inside mouth       Medical Decision Making:    ASSESMENT AND PLAN     Michael was seen today for urgent care and sore.    Diagnoses and all orders for this visit:    Mouth sores        Discussed with patient that it seems to be viral in etiology suggested to continue doing symptomatic treatment might and get rash in the back of the hands and the foot if so should do symptomatic treatment  Tylenol, Ibuprofen and gargling with warm salt water     I have reviewed the nursing notes.  Differential Diagnosis:  Cancre sores /hand-foot-and-mouth disease/aphthous ulcer      See orders in Epic  Pt verbalized and agreed with the plan and is aware of the worsening symptoms for which would need to follow up .  Pt was stable during time of discharge from the clinic     X-Ray was not done.    Time  spent on the date of the encounter doing chart review, patient visit and documentation     If not improving or if condition worsens, follow up with your Primary Care Provider    SUBJECTIVE     Michael Darden is a 35 year old male presenting with a chief complaint of    Chief Complaint   Patient presents with     Urgent Care     Sore     c/o sores inside mouth           Michael Darden is a 35 year old male presenting with a chief complaint of sores on the roof of the mouth.  He has been noticing it for almost a day.  Started noticing it yesterday and felt like a burning sensation in the back of the mouth.  Denies any sore throat or any fever or chills or any other URI symptoms.  he is an established patient of [facility].  Onset of symptoms was 1 day(s) ago.  Course of illness is same.    Severity moderate  Current and Associated symptoms: sores in the roof of the mouth   Treatment measures tried include None tried.  Predisposing factors include None.    History reviewed. No pertinent past medical history.  Current Outpatient Medications   Medication Sig  Dispense Refill     Acetaminophen (TYLENOL EXTRA STRENGTH PO) Take 2 tablets by mouth 2 times daily       amoxicillin-clavulanate (AUGMENTIN) 875-125 MG per tablet Take 1 tablet by mouth 2 times daily (Patient not taking: Reported on 5/18/2021) 20 tablet 0     Social History     Tobacco Use     Smoking status: Never Smoker     Smokeless tobacco: Never Used   Substance Use Topics     Alcohol use: Yes     Alcohol/week: 0.0 standard drinks     Comment: 4 -5  drink a week     Family History   Problem Relation Age of Onset     Cancer Mother      Breast Cancer Mother      Coronary Artery Disease Father      Hypertension Father          ROS:    10 point ROS of systems including Constitutional, Eyes, Respiratory, Cardiovascular, Gastroenterology, Genitourinary,  Muscularskeletal, Psychiatric ,neurological were all negative except for pertinent positives noted in my HPI         OBJECTIVE:    /79   Pulse 59   Temp 98.1  F (36.7  C) (Tympanic)   Wt 108.9 kg (240 lb)   SpO2 99%   BMI 27.73 kg/m    GENERAL APPEARANCE: healthy, alert and no distress  EYES: EOMI,  PERRL, conjunctiva clear   Nose and mouth showed erythematous tiny dot-like lesions in the roof of the mouth   Skin -no rash noted over the hands   PSYCH: mentation appears normal  Physical Exam      (Note was completed, in part, with [employer] voice-recognition software. Documentation reviewed, but some grammatical, spelling, and word errors may remain.)  Rhoda Still MD on 5/18/2021 at 7:58 PM

## 2021-09-19 ENCOUNTER — HEALTH MAINTENANCE LETTER (OUTPATIENT)
Age: 36
End: 2021-09-19

## 2022-01-09 ENCOUNTER — HEALTH MAINTENANCE LETTER (OUTPATIENT)
Age: 37
End: 2022-01-09

## 2022-11-21 ENCOUNTER — HEALTH MAINTENANCE LETTER (OUTPATIENT)
Age: 37
End: 2022-11-21

## 2023-03-02 ENCOUNTER — OFFICE VISIT (OUTPATIENT)
Dept: DERMATOLOGY | Facility: CLINIC | Age: 38
End: 2023-03-02
Payer: COMMERCIAL

## 2023-03-02 DIAGNOSIS — D22.9 MULTIPLE BENIGN NEVI: ICD-10-CM

## 2023-03-02 DIAGNOSIS — D18.01 CHERRY ANGIOMA: ICD-10-CM

## 2023-03-02 DIAGNOSIS — L57.8 ACTINIC SKIN DAMAGE: Primary | ICD-10-CM

## 2023-03-02 PROCEDURE — 99203 OFFICE O/P NEW LOW 30 MIN: CPT | Performed by: STUDENT IN AN ORGANIZED HEALTH CARE EDUCATION/TRAINING PROGRAM

## 2023-03-02 NOTE — PROGRESS NOTES
[facility] Dermatology Note    Encounter Date: Mar 2, 2023    Dermatology Problem List:  -  ______________________________________    Impression/Plan:  Michael was seen today for skin check.    Diagnoses and all orders for this visit:    Actinic skin damage  Multiple benign nevi  - discussed sunprotective behaviors, clothing, and the use of sunscreen    Cherry angioma  - benign  - larger lesion believed to be a mole on the back is actually a larger cherry hemangioma      Follow-up in 1 yr.       Staff Involved:  Staff Only    Parvez Gamino MD   of Dermatology  Department of Dermatology  [facility]      CC:   Chief Complaint   Patient presents with     Skin Check     Patient states he has a bump on his right ribs and a mole of concern on his back.       History of Present Illness:  Mr. Michael Darden is a 37 year old male who presents as a new patient.    Spots on R flank present for years, feels they may be changing slightly. No bleeding     Labs:      Physical exam:  Vitals: There were no vitals taken for this visit.  GEN: well developed, well-nourished, in no acute distress, in a pleasant mood.     SKIN: Traylor phototype 1  - Full skin, which includes the head/face, both arms, chest, back, abdomen,both legs, genitalia and/or groin buttocks, digits and/or nails, was examined.  - scattered brown papules on trunk and extremities   - Flat brown macules and patches in a sun exposed areas on face and extremities  - pink vascular papules on trunk and extremities  - No other lesions of concern on areas examined.     Past Medical History:   History reviewed. No pertinent past medical history.  Past Surgical History:   Procedure Laterality Date     deviated septum         Social History:   reports that he has never smoked. He has never used smokeless tobacco. He reports current alcohol use. He reports that he does not use drugs.    Family  History:  Family History   Problem Relation Age of Onset     Cancer Mother      Breast Cancer Mother      Coronary Artery Disease Father      Hypertension Father        Medications:  No current outpatient medications on file.     Allergies   Allergen Reactions     Polytrim [Polymyxin B-Trimethoprim]      Worsening conjunctivitis with use

## 2023-03-02 NOTE — LETTER
Date:March 3, 2023      Provider requested that no letter be sent. Do not send.       [facility]

## 2023-03-02 NOTE — LETTER
3/2/2023       RE: Michael Darden  [address] [place] [address]     Dear Colleague,    Thank you for referring your patient, Michael Darden, to the [facility]MAT[facility] [place] [facility]. Please see a copy of my visit note below.    [facility] Dermatology Note    Encounter Date: Mar 2, 2023    Dermatology Problem List:  -  ______________________________________    Impression/Plan:  Michael was seen today for skin check.    Diagnoses and all orders for this visit:    Actinic skin damage  Multiple benign nevi  - discussed sunprotective behaviors, clothing, and the use of sunscreen    Cherry angioma  - benign  - larger lesion believed to be a mole on the back is actually a larger cherry hemangioma      Follow-up in 1 yr.       Staff Involved:  Staff Only    Parvez Gamino MD   of Dermatology  Department of Dermatology  [facility]      CC:   Chief Complaint   Patient presents with     Skin Check     Patient states he has a bump on his right ribs and a mole of concern on his back.       History of Present Illness:  Mr. Michael Darden is a 37 year old male who presents as a new patient.    Spots on R flank present for years, feels they may be changing slightly. No bleeding     Labs:      Physical exam:  Vitals: There were no vitals taken for this visit.  GEN: well developed, well-nourished, in no acute distress, in a pleasant mood.     SKIN: Traylor phototype 1  - Full skin, which includes the head/face, both arms, chest, back, abdomen,both legs, genitalia and/or groin buttocks, digits and/or nails, was examined.  - scattered brown papules on trunk and extremities   - Flat brown macules and patches in a sun exposed areas on face and extremities  - pink vascular papules on trunk and extremities  - No other lesions of concern on areas examined.     Past Medical  History:   History reviewed. No pertinent past medical history.  Past Surgical History:   Procedure Laterality Date     deviated septum         Social History:   reports that he has never smoked. He has never used smokeless tobacco. He reports current alcohol use. He reports that he does not use drugs.    Family History:  Family History   Problem Relation Age of Onset     Cancer Mother      Breast Cancer Mother      Coronary Artery Disease Father      Hypertension Father        Medications:  No current outpatient medications on file.     Allergies   Allergen Reactions     Polytrim [Polymyxin B-Trimethoprim]      Worsening conjunctivitis with use                   Again, thank you for allowing me to participate in the care of your patient.      Sincerely,    Parvez Gamino MD

## 2023-04-04 ENCOUNTER — TELEPHONE (OUTPATIENT)
Dept: INTERNAL MEDICINE | Facility: CLINIC | Age: 38
End: 2023-04-04

## 2023-04-04 NOTE — TELEPHONE ENCOUNTER
General Call    Contacts       Type Contact Phone/Fax    04/04/2023 10:12 AM CDT Phone (Incoming) Michael Darden (Self)         Reason for Call:  New pt    What are your questions or concerns:  Pt called and would like to be a patient of Dr Jeffery, Please advise and so we can schedule pt.      Date of last appointment with provider: n/a    Could we send this information to you in [employer] or would you prefer to receive a phone call?:   Patient would prefer a phone call   Okay to leave a detailed message?: Yes at Cell number on file:    Telephone Information:   Mobile [phone]

## 2023-04-10 ENCOUNTER — OFFICE VISIT (OUTPATIENT)
Dept: INTERNAL MEDICINE | Facility: CLINIC | Age: 38
End: 2023-04-10
Payer: COMMERCIAL

## 2023-04-10 VITALS
HEIGHT: 78 IN | OXYGEN SATURATION: 100 % | RESPIRATION RATE: 16 BRPM | TEMPERATURE: 97.7 F | DIASTOLIC BLOOD PRESSURE: 71 MMHG | SYSTOLIC BLOOD PRESSURE: 126 MMHG | BODY MASS INDEX: 29.17 KG/M2 | WEIGHT: 252.1 LBS | HEART RATE: 68 BPM

## 2023-04-10 DIAGNOSIS — Z00.00 ANNUAL PHYSICAL EXAM: Primary | ICD-10-CM

## 2023-04-10 DIAGNOSIS — Z13.1 SCREENING FOR DIABETES MELLITUS: ICD-10-CM

## 2023-04-10 DIAGNOSIS — E66.3 OVERWEIGHT (BMI 25.0-29.9): ICD-10-CM

## 2023-04-10 DIAGNOSIS — Z13.220 SCREENING FOR HYPERLIPIDEMIA: ICD-10-CM

## 2023-04-10 DIAGNOSIS — D17.30 LIPOMA OF SKIN AND SUBCUTANEOUS TISSUE: ICD-10-CM

## 2023-04-10 LAB
ALBUMIN SERPL BCG-MCNC: 4.4 G/DL (ref 3.5–5.2)
ALP SERPL-CCNC: 54 U/L (ref 40–129)
ALT SERPL W P-5'-P-CCNC: 29 U/L (ref 10–50)
ANION GAP SERPL CALCULATED.3IONS-SCNC: 12 MMOL/L (ref 7–15)
AST SERPL W P-5'-P-CCNC: 46 U/L (ref 10–50)
BILIRUB SERPL-MCNC: 0.6 MG/DL
BUN SERPL-MCNC: 17.6 MG/DL (ref 6–20)
CALCIUM SERPL-MCNC: 9.8 MG/DL (ref 8.6–10)
CHLORIDE SERPL-SCNC: 105 MMOL/L (ref 98–107)
CHOLEST SERPL-MCNC: 173 MG/DL
CREAT SERPL-MCNC: 1.03 MG/DL (ref 0.67–1.17)
DEPRECATED HCO3 PLAS-SCNC: 24 MMOL/L (ref 22–29)
ERYTHROCYTE [DISTWIDTH] IN BLOOD BY AUTOMATED COUNT: 12.7 % (ref 10–15)
GFR SERPL CREATININE-BSD FRML MDRD: >90 ML/MIN/1.73M2
GLUCOSE SERPL-MCNC: 96 MG/DL (ref 70–99)
HBA1C MFR BLD: 5.4 % (ref 0–5.6)
HCT VFR BLD AUTO: 44.1 % (ref 40–53)
HDLC SERPL-MCNC: 58 MG/DL
HGB BLD-MCNC: 14.9 G/DL (ref 13.3–17.7)
LDLC SERPL CALC-MCNC: 97 MG/DL
MCH RBC QN AUTO: 30.1 PG (ref 26.5–33)
MCHC RBC AUTO-ENTMCNC: 33.8 G/DL (ref 31.5–36.5)
MCV RBC AUTO: 89 FL (ref 78–100)
NONHDLC SERPL-MCNC: 115 MG/DL
PLATELET # BLD AUTO: 301 10E3/UL (ref 150–450)
POTASSIUM SERPL-SCNC: 4.1 MMOL/L (ref 3.4–5.3)
PROT SERPL-MCNC: 7.3 G/DL (ref 6.4–8.3)
RBC # BLD AUTO: 4.95 10E6/UL (ref 4.4–5.9)
SODIUM SERPL-SCNC: 141 MMOL/L (ref 136–145)
TRIGL SERPL-MCNC: 88 MG/DL
TSH SERPL DL<=0.005 MIU/L-ACNC: 2.53 UIU/ML (ref 0.3–4.2)
WBC # BLD AUTO: 6.9 10E3/UL (ref 4–11)

## 2023-04-10 PROCEDURE — 36415 COLL VENOUS BLD VENIPUNCTURE: CPT | Performed by: INTERNAL MEDICINE

## 2023-04-10 PROCEDURE — 80053 COMPREHEN METABOLIC PANEL: CPT | Performed by: INTERNAL MEDICINE

## 2023-04-10 PROCEDURE — 99214 OFFICE O/P EST MOD 30 MIN: CPT | Mod: 25 | Performed by: INTERNAL MEDICINE

## 2023-04-10 PROCEDURE — 84443 ASSAY THYROID STIM HORMONE: CPT | Performed by: INTERNAL MEDICINE

## 2023-04-10 PROCEDURE — 80061 LIPID PANEL: CPT | Performed by: INTERNAL MEDICINE

## 2023-04-10 PROCEDURE — 83036 HEMOGLOBIN GLYCOSYLATED A1C: CPT | Performed by: INTERNAL MEDICINE

## 2023-04-10 PROCEDURE — 99395 PREV VISIT EST AGE 18-39: CPT | Performed by: INTERNAL MEDICINE

## 2023-04-10 PROCEDURE — 85027 COMPLETE CBC AUTOMATED: CPT | Performed by: INTERNAL MEDICINE

## 2023-04-10 NOTE — PROGRESS NOTES
Office Visit - Physical   Michael Darden   37 year old  male    Date of visit: 4/10/2023  Physician: Miguel Angel Jeffery MD     Assessment and Plan   1. Annual physical exam  This is a 37-year-old man with issues as discussed below.  Ongoing healthy lifestyle discussed recommend    2. Screening for hyperlipidemia  - Lipid panel reflex to direct LDL Non-fasting; Future  - Lipid panel reflex to direct LDL Non-fasting    3. Screening for diabetes mellitus  - Hemoglobin A1c; Future  - Hemoglobin A1c    4. Lipoma of skin and subcutaneous tissue  I agree with his dermatologist that this is likely a lipoma.  In the event that is changing I would recommend imaging study either ultrasound or MRI.    5. Overweight (BMI 25.0-29.9)  - CBC with platelets; Future  - Comprehensive metabolic panel; Future  - TSH with free T4 reflex; Future  - CBC with platelets  - Comprehensive metabolic panel  - TSH with free T4 reflex  The following high BMI interventions were performed this visit: encouragement to exercise and lifestyle education regarding diet    Return in about 1 year (around 4/10/2024) for Routine preventive.     Chief Complaint   Chief Complaint   Patient presents with     office visit     Recheck Medication     Pt reports that he is here for routine check up. No concerns.        Patient Profile   Social History     Social History Narrative    Lives with his wife, Kelsy and five children, Ray (2012), Mckenna (2014), Annie (2017), Celio (2019) and Yue (2022).  From [place].  , Danielle  Kelsy is a therapist.          Past Medical History   There is no problem list on file for this patient.      Past Surgical History  He has a past surgical history that includes Septoplasty.     History of Present Illness   This 37 year old man comes in to [facility] care and for annual physical.  Overall feeling well and has no complaints.  Recently saw dermatology about a lump under the skin on the right chest  "wall.    Review of Systems: A comprehensive review of systems was negative except as noted.     Medications and Allergies   No current outpatient medications on file.     Allergies   Allergen Reactions     Polytrim [Polymyxin B-Trimethoprim]      Worsening conjunctivitis with use        Family and Social History   Family History   Problem Relation Age of Onset     Breast Cancer Mother         metastatic     Hypertension Father      No Known Problems Brother      No Known Problems Daughter      No Known Problems Daughter      No Known Problems Daughter      No Known Problems Son      No Known Problems Son         Social History     Tobacco Use     Smoking status: Never     Smokeless tobacco: Never   Substance Use Topics     Alcohol use: Yes     Alcohol/week: 0.0 standard drinks of alcohol     Comment: 4 -5  drink a week     Drug use: No        Physical Exam   General Appearance:   No acute distress    /71 (BP Location: Left arm, Patient Position: Sitting, Cuff Size: Adult Regular)   Pulse 68   Temp 97.7  F (36.5  C) (Tympanic)   Resp 16   Ht 1.981 m (6' 6\")   Wt 114.4 kg (252 lb 1.6 oz)   SpO2 100%   BMI 29.13 kg/m      EYES: Eyelids, conjunctiva, and sclera were normal. Pupils were normal. Cornea, iris, and lens were normal bilaterally.  HEAD, EARS, NOSE, MOUTH, AND THROAT: Head and face were normal. Hearing was normal to voice and the ears were normal to external exam.  NECK: Neck appearance was normal. There were no neck masses and the thyroid was not enlarged.  RESPIRATORY: Breathing pattern was normal and the chest moved symmetrically.  Percussion/auscultatory percussion was normal.  Lung sounds were normal and there were no abnormal sounds.  CARDIOVASCULAR: Heart rate and rhythm were normal.  S1 and S2 were normal and there were no extra sounds or murmurs. Peripheral pulses in arms and legs were normal.  Jugular venous pressure was normal.  There was no peripheral edema.  GASTROINTESTINAL: The " abdomen was normal in contour.  Bowel sounds were present.  Percussion detected no organ enlargement or tenderness.  Palpation detected no tenderness, mass, or enlarged organs.   MUSCULOSKELETAL: Skeletal configuration was normal and muscle mass was normal for age. Joint appearance was overall normal.  LYMPHATIC: There were no enlarged nodes.  SKIN/HAIR/NAILS: Skin color was normal.  There were no skin lesions.  Hair and nails were normal.  Right chest wall he does have about a 3 x 1 cm soft tissue palpable mobile soft mass consistent with a lipoma  NEUROLOGIC: The patient was alert and oriented to person, place, time, and circumstance. Speech was normal. Cranial nerves were normal. Motor strength was normal for age. The patient was normally coordinated.  PSYCHIATRIC:  Mood and affect were normal and the patient had normal recent and remote memory. The patient's judgment and insight were normal.       Additional Information        Miguel Angel Jeffery MD  Internal Medicine  Contact me at [phone]  Answers for HPI/ROS submitted by the patient on 4/10/2023  What is the reason for your visit today? : Updated Physical  How many servings of fruits and vegetables do you eat daily?: 2-3  On average, how many sweetened beverages do you drink each day (Examples: soda, juice, sweet tea, etc.  Do NOT count diet or artificially sweetened beverages)?: 0  How many minutes a day do you exercise enough to make your heart beat faster?: 30 to 60  How many days a week do you exercise enough to make your heart beat faster?: 4  How many days per week do you miss taking your medication?: 0

## 2023-08-12 ENCOUNTER — OFFICE VISIT (OUTPATIENT)
Dept: URGENT CARE | Facility: URGENT CARE | Age: 38
End: 2023-08-12
Payer: COMMERCIAL

## 2023-08-12 VITALS
HEART RATE: 65 BPM | WEIGHT: 252 LBS | BODY MASS INDEX: 29.12 KG/M2 | SYSTOLIC BLOOD PRESSURE: 129 MMHG | OXYGEN SATURATION: 99 % | DIASTOLIC BLOOD PRESSURE: 81 MMHG | TEMPERATURE: 98.5 F

## 2023-08-12 DIAGNOSIS — L03.113 CELLULITIS OF RIGHT UPPER EXTREMITY: Primary | ICD-10-CM

## 2023-08-12 PROCEDURE — 99213 OFFICE O/P EST LOW 20 MIN: CPT | Performed by: PHYSICIAN ASSISTANT

## 2023-08-12 RX ORDER — CEPHALEXIN 500 MG/1
500 CAPSULE ORAL 3 TIMES DAILY
Qty: 30 CAPSULE | Refills: 0 | Status: SHIPPED | OUTPATIENT
Start: 2023-08-12 | End: 2023-08-22

## 2023-08-12 NOTE — PROGRESS NOTES
Cellulitis of right upper extremity  - cephALEXin (KEFLEX) 500 MG capsule; Take 1 capsule (500 mg) by mouth 3 times daily for 10 days    Patient and I briefly discussed the concern of Lyme disease.  I informed him that he should consider getting tested in 2 weeks with his primary care provider but that chances are low considering that a tick would have to be attached for 1 to 2 days in order to transmit the disease. I offered treating him empirically but patient would prefer to get tested in two weeks time.      Cellulitis: Care Instructions  Your Care Instructions     Cellulitis is a skin infection caused by bacteria, most often strep or staph. It often occurs after a break in the skin from a scrape, cut, bite, or puncture, or after a rash.  Cellulitis may be treated without doing tests to find out what caused it. But your doctor may do tests, if needed, to look for a specific bacteria, like methicillin-resistant Staphylococcus aureus (MRSA).  The doctor has checked you carefully, but problems can develop later. If you notice any problems or new symptoms, get medical treatment right away.  Follow-up care is a key part of your treatment and safety. Be sure to make and go to all appointments, and call your doctor if you are having problems. It's also a good idea to know your test results and keep a list of the medicines you take.  How can you care for yourself at home?  Take your antibiotics as directed. Do not stop taking them just because you feel better. You need to take the full course of antibiotics.  Prop up the infected area on pillows to reduce pain and swelling. Try to keep the area above the level of your heart as often as you can.  If your doctor told you how to care for your wound, follow your doctor's instructions. If you did not get instructions, follow this general advice:  Wash the wound with clean water 2 times a day. Don't use hydrogen peroxide or alcohol, which can slow healing.  You may cover  "the wound with a thin layer of petroleum jelly, such as Vaseline, and a nonstick bandage.  Apply more petroleum jelly and replace the bandage as needed.  Be safe with medicines. Take pain medicines exactly as directed.  If the doctor gave you a prescription medicine for pain, take it as prescribed.  If you are not taking a prescription pain medicine, ask your doctor if you can take an over-the-counter medicine.  To prevent cellulitis in the future  Try to prevent cuts, scrapes, or other injuries to your skin. Cellulitis most often occurs where there is a break in the skin.  If you get a scrape, cut, mild burn, or bite, wash the wound with clean water as soon as you can to help avoid infection. Don't use hydrogen peroxide or alcohol, which can slow healing.  If you have swelling in your legs (edema), support stockings and good skin care may help prevent leg sores and cellulitis.  Take care of your feet, especially if you have diabetes or other conditions that increase the risk of infection. Wear shoes and socks. Do not go barefoot. If you have athlete's foot or other skin problems on your feet, talk to your doctor about how to treat them.  When should you call for help?   Call your doctor now or seek immediate medical care if:    You have signs that your infection is getting worse, such as:  Increased pain, swelling, warmth, or redness.  Red streaks leading from the area.  Pus draining from the area.  A fever.     You get a rash.   Watch closely for changes in your health, and be sure to contact your doctor if:    You do not get better as expected.   Where can you learn more?  Go to https://www.[employer].net/patiented  Enter X309 in the search box to learn more about \"Cellulitis: Care Instructions.\"  Current as of: March 22, 2023               Content Version: 13.7    0058-8600 [employer].   Care instructions adapted under license by your healthcare professional. If you have questions about a medical " condition or this instruction, always ask your healthcare professional. Health[employer], [employer] disclaims any warranty or liability for your use of this information.               Kelvin Orozco PA-C  [facility]    Subjective   37 year old who presents to clinic today for the following health issues:    Urgent Care and Insect Bites       HPI     Patient visits today for suspected spider bite in his right anterior shoulder 3 days ago.  It is progressively been getting more red and swollen.  He has began to have radiating pain down his arm. Patient did not see what bit/stung him. He is not aware of any ticks. Patient denies fever, chills, or fatigue. Denies any rash. No bullseye rash.    Review of Systems   Review of Systems   See HPI    Objective    Temp: 98.5  F (36.9  C) Temp src: Tympanic BP: 129/81 Pulse: 65     SpO2: 99 %       Physical Exam   Physical Exam  Constitutional:       General: He is not in acute distress.     Appearance: Normal appearance. He is normal weight. He is not ill-appearing, toxic-appearing or diaphoretic.   HENT:      Head: Normocephalic and atraumatic.   Cardiovascular:      Rate and Rhythm: Normal rate.      Pulses: Normal pulses.   Pulmonary:      Effort: Pulmonary effort is normal. No respiratory distress.   Skin:            Comments: The area shown above measures about 3 inches in diameter.  It is erythematous, warm, nontender to touch, and somewhat elevated and swollen.  There does not appear to be any bull's-eye rash present.   Neurological:      General: No focal deficit present.      Mental Status: He is alert and oriented to person, place, and time. Mental status is at baseline.      Gait: Gait normal.   Psychiatric:         Mood and Affect: Mood normal.         Behavior: Behavior normal.         Thought Content: Thought content normal.         Judgment: Judgment normal.          No results found for this or any previous visit (from the past 24 hour(s)).

## 2023-12-29 NOTE — TELEPHONE ENCOUNTER
MEDICAL RECORDS REQUEST   [facility]  Urology Clinic  [address] [place] [address]  PHONE: [phone]  Fax: [phone]        FUTURE VISIT INFORMATION                                                   Michael Darden, : 1985 scheduled for future visit at [facility]    APPOINTMENT INFORMATION:  Date:   Provider:  Giorgi Elena MD  Reason for Visit/Diagnosis: Vasectomy Consult    RECORDS REQUESTED FOR VISIT                                                     NOTES  STATUS/DETAILS   MEDICATION LIST  yes     PRE-VISIT CHECKLIST      Joint diagnostic appointment coordinated correctly          (ensure right order & amount of time) Yes   RECORD COLLECTION COMPLETE Yes

## 2024-02-13 ENCOUNTER — PRE VISIT (OUTPATIENT)
Dept: UROLOGY | Facility: CLINIC | Age: 39
End: 2024-02-13
Payer: COMMERCIAL

## 2024-02-13 NOTE — CONFIDENTIAL NOTE
Reason for visit: vasectomy consult    Records/imaging/labs/orders: in epic    Insurance:  Does patient carry state insurance?  No  Examples of state insurance:  Medicaid - this is straight Medical Assistance  [employer] - [place] product through [employer] [facility] - [place] product through [facility] PMAP  [employer] PMAP  [employer] [place]  [employer] PMAP  [facility] PMAP    If YES (Consent for Sterilization must be completed by patient and provider)    At Rooming: lucille Chauhan  02/13/24  8:14 AM

## 2024-02-20 ENCOUNTER — LAB (OUTPATIENT)
Dept: LAB | Facility: CLINIC | Age: 39
End: 2024-02-20
Attending: EMERGENCY MEDICINE
Payer: COMMERCIAL

## 2024-02-20 ENCOUNTER — E-VISIT (OUTPATIENT)
Dept: URGENT CARE | Facility: CLINIC | Age: 39
End: 2024-02-20
Payer: COMMERCIAL

## 2024-02-20 DIAGNOSIS — J02.9 SORE THROAT: ICD-10-CM

## 2024-02-20 DIAGNOSIS — J02.9 SORE THROAT: Primary | ICD-10-CM

## 2024-02-20 LAB
DEPRECATED S PYO AG THROAT QL EIA: NEGATIVE
GROUP A STREP BY PCR: NOT DETECTED

## 2024-02-20 PROCEDURE — 87651 STREP A DNA AMP PROBE: CPT

## 2024-02-20 PROCEDURE — 99207 PR NON-BILLABLE SERV PER CHARTING: CPT | Performed by: EMERGENCY MEDICINE

## 2024-02-20 NOTE — PATIENT INSTRUCTIONS
Sore Throat: Care Instructions  Overview     Infection by bacteria or a virus causes most sore throats. Cigarette smoke, dry air, air pollution, allergies, and yelling can also cause a sore throat. Sore throats can be painful and annoying. Fortunately, most sore throats go away on their own. If you have a bacterial infection, your doctor may prescribe antibiotics.  Follow-up care is a key part of your treatment and safety. Be sure to make and go to all appointments, and call your doctor if you are having problems. It's also a good idea to know your test results and keep a list of the medicines you take.  How can you care for yourself at home?    If your doctor prescribed antibiotics, take them as directed. Do not stop taking them just because you feel better. You need to take the full course of antibiotics.    Gargle with warm salt water several times a day to help reduce swelling and relieve pain. Mix 1/2 teaspoon of salt in 1 cup of warm water.    Take an over-the-counter pain medicine, such as acetaminophen (Tylenol), ibuprofen (Advil, Motrin), or naproxen (Aleve). Read and follow all instructions on the label.    Be careful when taking over-the-counter cold or flu medicines and Tylenol at the same time. Many of these medicines have acetaminophen, which is Tylenol. Read the labels to make sure that you are not taking more than the recommended dose. Too much acetaminophen (Tylenol) can be harmful.    Drink plenty of fluids. Fluids may help soothe an irritated throat. Hot fluids, such as tea or soup, may help decrease throat pain.    Use over-the-counter throat lozenges to soothe pain. Regular cough drops or hard candy may also help. These should not be given to young children because of the risk of choking.    Do not smoke or allow others to smoke around you. If you need help quitting, talk to your doctor about stop-smoking programs and medicines. These can increase your chances of quitting for good.    Use a  "vaporizer or humidifier to add moisture to your bedroom. Follow the directions for cleaning the machine.  When should you call for help?   Call your doctor now or seek immediate medical care if:      You have trouble breathing.       Your sore throat gets much worse on one side.       You have new or worse trouble swallowing.       You have a new or higher fever.   Watch closely for changes in your health, and be sure to contact your doctor if you do not get better as expected.  Where can you learn more?  Go to https://www.[employer].net/patiented  Enter U420 in the search box to learn more about \"Sore Throat: Care Instructions.\"  Current as of: February 28, 2023               Content Version: 13.8    2951-3238 [employer].   Care instructions adapted under license by your healthcare professional. If you have questions about a medical condition or this instruction, always ask your healthcare professional. Healthwise, [employer] disclaims any warranty or liability for your use of this information.      "

## 2024-02-21 ENCOUNTER — E-VISIT (OUTPATIENT)
Dept: URGENT CARE | Facility: CLINIC | Age: 39
End: 2024-02-21
Payer: COMMERCIAL

## 2024-02-21 ENCOUNTER — OFFICE VISIT (OUTPATIENT)
Dept: URGENT CARE | Facility: URGENT CARE | Age: 39
End: 2024-02-21
Payer: COMMERCIAL

## 2024-02-21 VITALS
BODY MASS INDEX: 28.89 KG/M2 | SYSTOLIC BLOOD PRESSURE: 124 MMHG | RESPIRATION RATE: 14 BRPM | WEIGHT: 250 LBS | DIASTOLIC BLOOD PRESSURE: 82 MMHG | TEMPERATURE: 98 F | HEART RATE: 60 BPM | OXYGEN SATURATION: 97 %

## 2024-02-21 DIAGNOSIS — R23.8 VESICULAR RASH: Primary | ICD-10-CM

## 2024-02-21 DIAGNOSIS — R21 RASH AND NONSPECIFIC SKIN ERUPTION: Primary | ICD-10-CM

## 2024-02-21 PROCEDURE — 99213 OFFICE O/P EST LOW 20 MIN: CPT | Performed by: PHYSICIAN ASSISTANT

## 2024-02-21 PROCEDURE — 99207 PR NON-BILLABLE SERV PER CHARTING: CPT | Performed by: EMERGENCY MEDICINE

## 2024-02-21 RX ORDER — VALACYCLOVIR HYDROCHLORIDE 1 G/1
1000 TABLET, FILM COATED ORAL 3 TIMES DAILY
Qty: 21 TABLET | Refills: 0 | Status: SHIPPED | OUTPATIENT
Start: 2024-02-21 | End: 2024-06-06

## 2024-02-21 RX ORDER — CETIRIZINE HYDROCHLORIDE 10 MG/1
10 TABLET ORAL DAILY
Qty: 30 TABLET | Refills: 0 | Status: SHIPPED | OUTPATIENT
Start: 2024-02-21 | End: 2024-02-29

## 2024-02-21 RX ORDER — DIPHENHYDRAMINE HCL 25 MG
50 TABLET ORAL
Qty: 60 TABLET | Refills: 0 | Status: SHIPPED | OUTPATIENT
Start: 2024-02-21 | End: 2024-02-29

## 2024-02-21 NOTE — PROGRESS NOTES
SUBJECTIVE:  Michael Darden is a 38 year old male who presents to the clinic today for a rash.  Onset of rash was 4 day(s) ago.   Rash is gradual onset.  Location of the rash: torso.  Quality/symptoms of rash: itching, red, and blistering   Symptoms are mild and rash seems to be worsening.  Previous history of a similar rash? No  Recent exposure history: none known    Associated symptoms include: sore throat and mild malaise.    Strep test negative    No past medical history on file.  Current Outpatient Medications   Medication Sig Dispense Refill    cetirizine (ZYRTEC) 10 MG tablet Take 1 tablet (10 mg) by mouth daily 30 tablet 0    diphenhydrAMINE (BENADRYL) 25 MG tablet Take 2 tablets (50 mg) by mouth nightly as needed for itching or allergies 60 tablet 0    valACYclovir (VALTREX) 1000 mg tablet Take 1 tablet (1,000 mg) by mouth 3 times daily for 7 days 21 tablet 0     Social History     Tobacco Use    Smoking status: Never     Passive exposure: Never    Smokeless tobacco: Never   Substance Use Topics    Alcohol use: Yes     Alcohol/week: 0.0 standard drinks of alcohol     Comment: 4 -5  drink a week       ROS:  Review of systems negative except as stated above.    EXAM:   /82   Pulse 60   Temp 98  F (36.7  C) (Temporal)   Resp 14   Wt 113.4 kg (250 lb)   SpO2 97%   BMI 28.89 kg/m    GENERAL: alert, no acute distress.  SKIN: multiple areas of confluent vesicle on right and left flank, one on abdomen  GENERAL APPEARANCE: healthy, alert and no distress  EYES: EOMI,  PERRL, conjunctiva clear  RESP: lungs clear to auscultation - no rales, rhonchi or wheezes  CV: regular rates and rhythm, normal S1 S2, no murmur noted  NEURO: Normal strength and tone, sensory exam grossly normal,  normal speech and mentation    ASSESSMENT:  (R23.8) Vesicular rash  (primary encounter diagnosis)  Comment: likely atypical viral exanthem.  Will cover for atypical zoster/hsv  Plan: valACYclovir (VALTREX) 1000 mg tablet,          cetirizine (ZYRTEC) 10 MG tablet,         diphenhydrAMINE (BENADRYL) 25 MG tablet, Adult         Dermatology  Referral          Red flags and emergent follow up discussed, and understood by patient  Follow up with PCP if symptoms worsen or fail to improve

## 2024-02-29 ENCOUNTER — OFFICE VISIT (OUTPATIENT)
Dept: UROLOGY | Facility: CLINIC | Age: 39
End: 2024-02-29
Payer: COMMERCIAL

## 2024-02-29 ENCOUNTER — PRE VISIT (OUTPATIENT)
Dept: UROLOGY | Facility: CLINIC | Age: 39
End: 2024-02-29

## 2024-02-29 VITALS
BODY MASS INDEX: 28.35 KG/M2 | WEIGHT: 245 LBS | HEIGHT: 78 IN | SYSTOLIC BLOOD PRESSURE: 114 MMHG | HEART RATE: 67 BPM | DIASTOLIC BLOOD PRESSURE: 77 MMHG

## 2024-02-29 DIAGNOSIS — Z30.09 ENCOUNTER FOR VASECTOMY COUNSELING: Primary | ICD-10-CM

## 2024-02-29 PROCEDURE — 99202 OFFICE O/P NEW SF 15 MIN: CPT | Performed by: UROLOGY

## 2024-02-29 NOTE — PROGRESS NOTES
"CC: Desires sterilization, consult for vasectomy.    HPI: Michael Darden is a 38 year old male with 5 children, and he is intersted in getting a vasectomy for sterilization.      No history of  trauma or scrotal surgery.  No history of bleeding problems.    [place]/ [facility]? No    PMH:   No chronic medical problems   3rd molars extracted     FAMILY HX:   Family History   Problem Relation Age of Onset     Breast Cancer Mother         metastatic     Hypertension Father      No Known Problems Brother      No Known Problems Daughter      No Known Problems Daughter      No Known Problems Daughter      No Known Problems Son      No Known Problems Son       No history of coagulopathies     ALLERGIES:      Allergies   Allergen Reactions     Polytrim [Polymyxin B-Trimethoprim]      Worsening conjunctivitis with use     MEDS:   Current Outpatient Medications   Medication Sig     valACYclovir (VALTREX) 1000 mg tablet Take 1 tablet (1,000 mg) by mouth 3 times daily for 7 days     No current facility-administered medications for this visit.     SOCIAL HISTORY:  Social History     Tobacco Use     Smoking status: Never     Passive exposure: Never     Smokeless tobacco: Never   Substance Use Topics     Alcohol use: Yes     Alcohol/week: 0.0 standard drinks of alcohol     Comment: 4 -5  drink a week     Drug use: No      GENERAL PHYSICAL EXAM:   /77   Pulse 67   Ht 1.981 m (6' 6\")   Wt 111.1 kg (245 lb)   BMI 28.31 kg/m     Constitutional: No acute distress. Well nourished.   PSYCH: Normal mood and affect.   NEURO: Normal gait, no focal deficits.   EYES: Anicteric, EOMI, PERR  CARDIOPULMONARY: Breathing non-labored, pulse regular, no peripheral edema.   GI: Abdomen soft, non-tender,nondistended   MUSCULOSKELETAL: Normal limb proportions, no muscle wasting, no contractures.    SKIN: Normal virilized hair distribution, no lesions, warts or rashes over genitalia, abdomen extremities or face.   HEME/LYMPH: No echymosis, no " lymphadenopathy in groin, no lymphedema.     EXAM:  Phallus  normal   Testes descended, consistency is normal. No intra-testicular masses.  Epididymes present.  Vas present bilateraly, Both are very easy to find.  SCOTT deferred.        I discussed with him at length the risks and benefits of the procedure. He understands that this is a sterilization procedure, and not reversible contraception. He understands that reversals, while possible, are not guaranteed to work and fairly complex. I discussed with him the option of sperm cryopreservation.     I stressed that he continues to be fertile in the post-operative period, and that he should continue using other contraceptive methods, such as a condom, until he obtains a semen analysis and we review the results to confirm success of the procedure and infertility. I also stressed to him that recanalization and pregnancy can possibly occur (approx 1/2000 chance), even after we clear him with a semen analysis showing no motile sperm. I counseled him on the bob-operative risks of bleeding, infection, pain.  I described to him post-vasectomy pain syndrome that can occur in about 1 to 2% of men undergoing vasectomy. Usually this improves with time but in very rare cases can be persisting.    We also discussed recovery times (typically days if no complications) and post-operative care including use of ice packs, pain medication and wound care.    Plan:  - He will call to schedule vasectomy procedure.      Additional Coding Information:    Problems:  one acute uncomplicated illness or injury    Data Reviewed  Minimal or none    Level of risk:   low risk (e.g., OTC medication or observation, minor surgery without risks)    Time spent:  10 minutes spent on the date of the encounter doing chart review, history and exam, documentation and further activities as noted above.

## 2024-02-29 NOTE — LETTER
"2/29/2024       RE: Michael Darden  [address] [place] [address]     Dear Colleague,    Thank you for referring your patient, Michael Darden, to the [facility] [place] [facility]. Please see a copy of my visit note below.    CC: Desires sterilization, consult for vasectomy.    HPI: Michael Darden is a 38 year old male with 5 children, and he is intersted in getting a vasectomy for sterilization.      No history of  trauma or scrotal surgery.  No history of bleeding problems.    [place]/ [facility]? No    PMH:   No chronic medical problems   3rd molars extracted     FAMILY HX:   Family History   Problem Relation Age of Onset    Breast Cancer Mother         metastatic    Hypertension Father     No Known Problems Brother     No Known Problems Daughter     No Known Problems Daughter     No Known Problems Daughter     No Known Problems Son     No Known Problems Son       No history of coagulopathies     ALLERGIES:      Allergies   Allergen Reactions    Polytrim [Polymyxin B-Trimethoprim]      Worsening conjunctivitis with use     MEDS:   Current Outpatient Medications   Medication Sig    valACYclovir (VALTREX) 1000 mg tablet Take 1 tablet (1,000 mg) by mouth 3 times daily for 7 days     No current facility-administered medications for this visit.     SOCIAL HISTORY:  Social History     Tobacco Use    Smoking status: Never     Passive exposure: Never    Smokeless tobacco: Never   Substance Use Topics    Alcohol use: Yes     Alcohol/week: 0.0 standard drinks of alcohol     Comment: 4 -5  drink a week    Drug use: No      GENERAL PHYSICAL EXAM:   /77   Pulse 67   Ht 1.981 m (6' 6\")   Wt 111.1 kg (245 lb)   BMI 28.31 kg/m     Constitutional: No acute distress. Well nourished.   PSYCH: Normal mood and affect.   NEURO: Normal gait, no focal deficits.   EYES: Anicteric, EOMI, PERR  CARDIOPULMONARY: Breathing non-labored, pulse " regular, no peripheral edema.   GI: Abdomen soft, non-tender,nondistended   MUSCULOSKELETAL: Normal limb proportions, no muscle wasting, no contractures.    SKIN: Normal virilized hair distribution, no lesions, warts or rashes over genitalia, abdomen extremities or face.   HEME/LYMPH: No echymosis, no lymphadenopathy in groin, no lymphedema.     EXAM:  Phallus  normal   Testes descended, consistency is normal. No intra-testicular masses.  Epididymes present.  Vas present bilateraly, Both are very easy to find.  SCOTT deferred.        I discussed with him at length the risks and benefits of the procedure. He understands that this is a sterilization procedure, and not reversible contraception. He understands that reversals, while possible, are not guaranteed to work and fairly complex. I discussed with him the option of sperm cryopreservation.     I stressed that he continues to be fertile in the post-operative period, and that he should continue using other contraceptive methods, such as a condom, until he obtains a semen analysis and we review the results to confirm success of the procedure and infertility. I also stressed to him that recanalization and pregnancy can possibly occur (approx 1/2000 chance), even after we clear him with a semen analysis showing no motile sperm. I counseled him on the bob-operative risks of bleeding, infection, pain.  I described to him post-vasectomy pain syndrome that can occur in about 1 to 2% of men undergoing vasectomy. Usually this improves with time but in very rare cases can be persisting.    We also discussed recovery times (typically days if no complications) and post-operative care including use of ice packs, pain medication and wound care.    Plan:  - He will call to schedule vasectomy procedure.      Additional Coding Information:    Problems:  one acute uncomplicated illness or injury    Data Reviewed  Minimal or none    Level of risk:   low risk (e.g., OTC medication or  observation, minor surgery without risks)    Time spent:  10 minutes spent on the date of the encounter doing chart review, history and exam, documentation and further activities as noted above.     Giorgi Elena MD

## 2024-02-29 NOTE — NURSING NOTE
"Michael Darden is a 38 year old male patient that presents today in clinic for the following:    Chief Complaint   Patient presents with    Consult     For vasectomy       The patient's allergies and medications were reviewed as noted. A set of vitals were recorded as noted without incident. The patient does not have any other questions for the provider.    Blood pressure 114/77, pulse 67, height 1.981 m (6' 6\"), weight 111.1 kg (245 lb). Body mass index is 28.31 kg/m .    There is no problem list on file for this patient.      Allergies   Allergen Reactions    Polytrim [Polymyxin B-Trimethoprim]      Worsening conjunctivitis with use       Current Outpatient Medications   Medication Sig Dispense Refill    cetirizine (ZYRTEC) 10 MG tablet Take 1 tablet (10 mg) by mouth daily 30 tablet 0    diphenhydrAMINE (BENADRYL) 25 MG tablet Take 2 tablets (50 mg) by mouth nightly as needed for itching or allergies 60 tablet 0    valACYclovir (VALTREX) 1000 mg tablet Take 1 tablet (1,000 mg) by mouth 3 times daily for 7 days 21 tablet 0       Social History     Tobacco Use    Smoking status: Never     Passive exposure: Never    Smokeless tobacco: Never   Substance Use Topics    Alcohol use: Yes     Alcohol/week: 0.0 standard drinks of alcohol     Comment: 4 -5  drink a week    Drug use: No       Adalgisa Chauhan  2/29/2024  7:09 AM  "

## 2024-03-11 ENCOUNTER — PATIENT OUTREACH (OUTPATIENT)
Dept: CARE COORDINATION | Facility: CLINIC | Age: 39
End: 2024-03-11
Payer: COMMERCIAL

## 2024-05-17 ENCOUNTER — PRE VISIT (OUTPATIENT)
Dept: UROLOGY | Facility: CLINIC | Age: 39
End: 2024-05-17
Payer: COMMERCIAL

## 2024-05-17 DIAGNOSIS — Z30.2 ENCOUNTER FOR VASECTOMY: ICD-10-CM

## 2024-05-17 DIAGNOSIS — Z30.09 ENCOUNTER FOR VASECTOMY COUNSELING: Primary | ICD-10-CM

## 2024-05-17 NOTE — TELEPHONE ENCOUNTER
Patient coming in for vasectomy procedure    Sent [employer] message with prep which included:    Stop all blood thinners for seven days prior to procedure  Eat a meal prior to procedure  Shave the hair from the base of the penis and scrotum the night prior to procedure.      Bridger Abdi  05/17/24  2:32 PM

## 2024-06-06 ENCOUNTER — OFFICE VISIT (OUTPATIENT)
Dept: UROLOGY | Facility: CLINIC | Age: 39
End: 2024-06-06
Payer: COMMERCIAL

## 2024-06-06 VITALS
WEIGHT: 245 LBS | SYSTOLIC BLOOD PRESSURE: 131 MMHG | HEIGHT: 78 IN | DIASTOLIC BLOOD PRESSURE: 79 MMHG | HEART RATE: 93 BPM | OXYGEN SATURATION: 95 % | BODY MASS INDEX: 28.35 KG/M2

## 2024-06-06 DIAGNOSIS — Z30.2 ENCOUNTER FOR VASECTOMY: Primary | ICD-10-CM

## 2024-06-06 PROCEDURE — 55250 REMOVAL OF SPERM DUCT(S): CPT | Performed by: UROLOGY

## 2024-06-06 ASSESSMENT — PAIN SCALES - GENERAL: PAINLEVEL: NO PAIN (0)

## 2024-06-06 NOTE — PATIENT INSTRUCTIONS
What Can I Expect After My Vasectomy?     Your scrotum may be swollen and bruised. We suggest you:  - Raise the area and apply ice packs (or frozen vegetables). Use the ice packs for 20 minutes on and 20 minutes off for 24 to 36 hours.  - Wear a jock strap or tight briefs for support for a week.  - Limit your activity for the first 24 to 36 hours. Wait up to 48 hours, if you feel the need. No heavy lifting for 1 week.     You should be able to return to work the next day, unless you do heavy physical work.     You can safely ejaculate (have a sexual climax) in about one week, or when it feels comfortable.    Risk of pregnancy    After your vasectomy, you can still get your partner pregnant for three months or more. Use extra birth control, such as condoms, until tests show that you are sterile (no live sperm).    Vasectomy is not 100 percent reliable. Pregnancy may occur for about 1 out of 2000 men even after testing shows zero sperm count.    Can a vasectomy be reversed?    Vasectomy is meant to be birth control that lasts a lifetime. If you change your mind, we can try to reverse it with surgery. But this will not be successful in every case.    Sperm count test    You will have a sperm-count test after the vasectomy. You should have had at least 20 ejaculations (discharges of semen) since your surgery. It will be a few months before you are sterile. Ten to twelve weeks after your surgery, schedule a sperm count test. Call [phone] to schedule. We will call you in 2 weeks with the results.    If you have any questions, please contact us:    [facility] (nurse line): [phone]

## 2024-06-06 NOTE — NURSING NOTE
"Chief Complaint   Patient presents with    Sterilization       Blood pressure 131/79, pulse 93, height 1.981 m (6' 6\"), weight 111.1 kg (245 lb), SpO2 95%. Body mass index is 28.31 kg/m .    There is no problem list on file for this patient.      Allergies   Allergen Reactions    Polytrim [Polymyxin B-Trimethoprim]      Worsening conjunctivitis with use       Current Outpatient Medications   Medication Sig Dispense Refill    valACYclovir (VALTREX) 1000 mg tablet Take 1 tablet (1,000 mg) by mouth 3 times daily for 7 days 21 tablet 0       Social History     Tobacco Use    Smoking status: Never     Passive exposure: Never    Smokeless tobacco: Never   Substance Use Topics    Alcohol use: Yes     Alcohol/week: 0.0 standard drinks of alcohol     Comment: 4 -5  drink a week    Drug use: No       Invasive Procedure Safety Checklist:    Procedure: Bilateral Vasectomy    Action: Complete sections and checkboxes as appropriate.    Pre-procedure:  1. Patient ID Verified with 2 identifiers (Kayla and  or MRN) : YES    2. Procedure and site verified with patient/designee (when able) : YES    3. Accurate consent documentation in medical record : YES    4. H&P (or appropriate assessment) documented in medical record : N/A  H&P must be up to 30 days prior to procedure an updated within 24 hours of                 Procedure as applicable.     5. Relevant diagnostic and radiology test results appropriately labeled and displayed as applicable : YES    6. Blood products, implants, devices, and/or special equipment available for the procedure as applicable : YES    7. Procedure site(s) marked with provider initials [Exclusions: none] : NO    8. Marking not required. Reason : Yes  Procedure does not require site marking    Time Out:     Time-Out performed immediately prior to starting procedure, including verbal and active participation of all team members addressing: YES    1. Correct patient identity.  2. Confirmed that the correct " side and site are marked.  3. An accurate procedure to be done.  4. Agreement on the procedure to be done.  5. Correct patient position.  6. Relevant images and results are properly labeled and appropriately displayed.  7. The need to administer antibiotics or fluids for irrigation purposes during the procedure as applicable.  8. Safety precautions based on patient history or medication use.    During Procedure: Verification of correct person, site, and procedure occurs any time the responsibility for care of the patient is transferred to another member of the care team.    The following medication was given:     MEDICATION:  Lidocaine without epinephrine 2%  ROUTE: administered by physician - scrotal   SITE: administered by physician - scrotal   DOSE: 10 mL  LOT #: [id number]  : Kelly Van Gogh Hair [employer]our   EXPIRATION DATE: 05/26  NDC#: 33078-012-02   Was there drug waste? Yes  Amount of drug waste (mL): 10 mL.  Reason for waste:  Single use vial    Prior to injection, verified patient identity using patient's name and date of birth.  Due to injection administration, patient instructed to remain in clinic for 15 minutes  afterwards, and to report any adverse reaction to me immediately.    Drug Amount Wasted:  Yes: 10 mL (20 mg/ml)  Vial/Syringe: Single dose vial      Bridger Abdi  6/6/2024  7:55 AM

## 2024-06-06 NOTE — LETTER
6/6/2024       RE: Michael Darden  [address] [place] [address]     Dear Colleague,    Thank you for referring your patient, Michael Darden, to the [facility] [place] at [facility]. Please see a copy of my visit note below.    Procedure: Vasectomy bilateral.   Anesthesia: Local lidocaine injection by surgeon.  Surgeon: VJ Elena M.D.   Assistant:  urology nursing staff present    Description of procedure: The patient has received education regarding vasectomy, including risks and benefits, we obtaiined consent and proceeded with the surgery. He understands that there is a risk of late failure from recanalization. He understands that he is fertile until he has completed one or more semen analyses and he is given clearance from us for unprotected intercourse.  He understands that we will contact him regarding the results of the post-vasectomy semen analysis, but it is his responsibility to make sure he is cleared before discontinuing other birth control methods.  I have discussed with him other risks including bleeding, infection, acute and possible long-term pain.    The right vas was ligated first.  This was done using the standard technique by grasping it with a three-finger  and lifting it up to the skin.  A small amount of lidocaine was infiltrated into the skin and vasal sheath.  The skin was punctured and dilated bluntly using the scalpel-less dissector.  The sheath was identified and a rigid clamp was passed around the vas which was then lifted out of the incision.  The vas was cleaned off from the deferential vessels and the sheath, and cautery was used to divide the vas between mosquitos.  A small segment was removed and discarded.  The lumen of the vas was cannulated to confirm its identity, and the lumens of both ends were cauterized.  Pen cautery was used sparingly/as needed for minor bleeders.  A facial  interposition was then performed with a single suture of 4-0 chromic. The skin defect was closed with a 4-0 chromic interrupted suture after confirming adequate hemostasis.       We then turned our attention to the left side and a similar technique was performed. This involved division, excision of a segment, cautery of the lumens, and fascial interposition.    There were no hematomas noted at the end of the procedure.  The patient tolerated the procedure well.    He was advised to return for a post vasectomy semen check in 2-3 months, and that he is not sterile and must continue to use contraception until we tell him otherwise (based on follow-up semen specimen(s)).    Plan:  -Post vasectomy semen analysis in 2-3 months   -ice packs to scrotum X 24h  -shower ok tomorrow  -OTC analgesics recommended     Sammie MERLOS

## 2024-06-06 NOTE — PROGRESS NOTES
Procedure: Vasectomy bilateral.   Anesthesia: Local lidocaine injection by surgeon.  Surgeon: VJ Elena M.D.   Assistant:  urology nursing staff present    Description of procedure: The patient has received education regarding vasectomy, including risks and benefits, we obtaiined consent and proceeded with the surgery. He understands that there is a risk of late failure from recanalization. He understands that he is fertile until he has completed one or more semen analyses and he is given clearance from us for unprotected intercourse.  He understands that we will contact him regarding the results of the post-vasectomy semen analysis, but it is his responsibility to make sure he is cleared before discontinuing other birth control methods.  I have discussed with him other risks including bleeding, infection, acute and possible long-term pain.    The right vas was ligated first.  This was done using the standard technique by grasping it with a three-finger  and lifting it up to the skin.  A small amount of lidocaine was infiltrated into the skin and vasal sheath.  The skin was punctured and dilated bluntly using the scalpel-less dissector.  The sheath was identified and a rigid clamp was passed around the vas which was then lifted out of the incision.  The vas was cleaned off from the deferential vessels and the sheath, and cautery was used to divide the vas between mosquitos.  A small segment was removed and discarded.  The lumen of the vas was cannulated to confirm its identity, and the lumens of both ends were cauterized.  Pen cautery was used sparingly/as needed for minor bleeders.  A facial interposition was then performed with a single suture of 4-0 chromic. The skin defect was closed with a 4-0 chromic interrupted suture after confirming adequate hemostasis.       We then turned our attention to the left side and a similar technique was performed. This involved division, excision of a segment, cautery of the  lumens, and fascial interposition.    There were no hematomas noted at the end of the procedure.  The patient tolerated the procedure well.    He was advised to return for a post vasectomy semen check in 2-3 months, and that he is not sterile and must continue to use contraception until we tell him otherwise (based on follow-up semen specimen(s)).    Plan:  -Post vasectomy semen analysis in 2-3 months   -ice packs to scrotum X 24h  -shower ok tomorrow  -OTC analgesics recommended     Sammie MERLOS

## 2024-06-23 ENCOUNTER — HEALTH MAINTENANCE LETTER (OUTPATIENT)
Age: 39
End: 2024-06-23

## 2024-10-07 ENCOUNTER — PATIENT OUTREACH (OUTPATIENT)
Dept: CARE COORDINATION | Facility: CLINIC | Age: 39
End: 2024-10-07
Payer: COMMERCIAL

## 2025-04-15 SDOH — HEALTH STABILITY: PHYSICAL HEALTH: ON AVERAGE, HOW MANY DAYS PER WEEK DO YOU ENGAGE IN MODERATE TO STRENUOUS EXERCISE (LIKE A BRISK WALK)?: 4 DAYS

## 2025-04-15 SDOH — HEALTH STABILITY: PHYSICAL HEALTH: ON AVERAGE, HOW MANY MINUTES DO YOU ENGAGE IN EXERCISE AT THIS LEVEL?: 60 MIN

## 2025-04-15 ASSESSMENT — SOCIAL DETERMINANTS OF HEALTH (SDOH): HOW OFTEN DO YOU GET TOGETHER WITH FRIENDS OR RELATIVES?: THREE TIMES A WEEK

## 2025-04-16 ENCOUNTER — OFFICE VISIT (OUTPATIENT)
Dept: INTERNAL MEDICINE | Facility: CLINIC | Age: 40
End: 2025-04-16
Payer: COMMERCIAL

## 2025-04-16 VITALS
DIASTOLIC BLOOD PRESSURE: 76 MMHG | BODY MASS INDEX: 29.62 KG/M2 | WEIGHT: 256 LBS | HEART RATE: 63 BPM | TEMPERATURE: 97.7 F | HEIGHT: 78 IN | OXYGEN SATURATION: 99 % | RESPIRATION RATE: 14 BRPM | SYSTOLIC BLOOD PRESSURE: 118 MMHG

## 2025-04-16 DIAGNOSIS — Z13.1 SCREENING FOR DIABETES MELLITUS: ICD-10-CM

## 2025-04-16 DIAGNOSIS — Z00.00 ANNUAL PHYSICAL EXAM: Primary | ICD-10-CM

## 2025-04-16 DIAGNOSIS — Z12.5 SCREENING FOR PROSTATE CANCER: ICD-10-CM

## 2025-04-16 DIAGNOSIS — Z13.220 SCREENING FOR HYPERLIPIDEMIA: ICD-10-CM

## 2025-04-16 DIAGNOSIS — R22.2 NODULE OF ANTERIOR CHEST WALL: ICD-10-CM

## 2025-04-16 LAB
ALBUMIN SERPL BCG-MCNC: 4.3 G/DL (ref 3.5–5.2)
ALP SERPL-CCNC: 62 U/L (ref 40–150)
ALT SERPL W P-5'-P-CCNC: 17 U/L (ref 0–70)
ANION GAP SERPL CALCULATED.3IONS-SCNC: 8 MMOL/L (ref 7–15)
AST SERPL W P-5'-P-CCNC: 20 U/L (ref 0–45)
BILIRUB SERPL-MCNC: 0.8 MG/DL
BUN SERPL-MCNC: 21.3 MG/DL (ref 6–20)
CALCIUM SERPL-MCNC: 9.7 MG/DL (ref 8.8–10.4)
CHLORIDE SERPL-SCNC: 105 MMOL/L (ref 98–107)
CHOLEST SERPL-MCNC: 171 MG/DL
CREAT SERPL-MCNC: 1.11 MG/DL (ref 0.67–1.17)
EGFRCR SERPLBLD CKD-EPI 2021: 86 ML/MIN/1.73M2
ERYTHROCYTE [DISTWIDTH] IN BLOOD BY AUTOMATED COUNT: 13.3 % (ref 10–15)
EST. AVERAGE GLUCOSE BLD GHB EST-MCNC: 103 MG/DL
FASTING STATUS PATIENT QL REPORTED: NO
FASTING STATUS PATIENT QL REPORTED: NO
GLUCOSE SERPL-MCNC: 96 MG/DL (ref 70–99)
HBA1C MFR BLD: 5.2 % (ref 0–5.6)
HCO3 SERPL-SCNC: 26 MMOL/L (ref 22–29)
HCT VFR BLD AUTO: 47 % (ref 40–53)
HDLC SERPL-MCNC: 53 MG/DL
HGB BLD-MCNC: 15.8 G/DL (ref 13.3–17.7)
LDLC SERPL CALC-MCNC: 104 MG/DL
MCH RBC QN AUTO: 29.9 PG (ref 26.5–33)
MCHC RBC AUTO-ENTMCNC: 33.6 G/DL (ref 31.5–36.5)
MCV RBC AUTO: 89 FL (ref 78–100)
NONHDLC SERPL-MCNC: 118 MG/DL
PLATELET # BLD AUTO: 315 10E3/UL (ref 150–450)
POTASSIUM SERPL-SCNC: 4.6 MMOL/L (ref 3.4–5.3)
PROT SERPL-MCNC: 7 G/DL (ref 6.4–8.3)
PSA SERPL DL<=0.01 NG/ML-MCNC: 1.29 NG/ML (ref 0–2.5)
RBC # BLD AUTO: 5.28 10E6/UL (ref 4.4–5.9)
SODIUM SERPL-SCNC: 139 MMOL/L (ref 135–145)
TRIGL SERPL-MCNC: 71 MG/DL
WBC # BLD AUTO: 6.6 10E3/UL (ref 4–11)

## 2025-04-16 PROCEDURE — G0103 PSA SCREENING: HCPCS | Performed by: INTERNAL MEDICINE

## 2025-04-16 PROCEDURE — 36415 COLL VENOUS BLD VENIPUNCTURE: CPT | Performed by: INTERNAL MEDICINE

## 2025-04-16 PROCEDURE — 90715 TDAP VACCINE 7 YRS/> IM: CPT | Performed by: INTERNAL MEDICINE

## 2025-04-16 PROCEDURE — 3074F SYST BP LT 130 MM HG: CPT | Performed by: INTERNAL MEDICINE

## 2025-04-16 PROCEDURE — 99213 OFFICE O/P EST LOW 20 MIN: CPT | Mod: 25 | Performed by: INTERNAL MEDICINE

## 2025-04-16 PROCEDURE — 1126F AMNT PAIN NOTED NONE PRSNT: CPT | Performed by: INTERNAL MEDICINE

## 2025-04-16 PROCEDURE — 85027 COMPLETE CBC AUTOMATED: CPT | Performed by: INTERNAL MEDICINE

## 2025-04-16 PROCEDURE — 3078F DIAST BP <80 MM HG: CPT | Performed by: INTERNAL MEDICINE

## 2025-04-16 PROCEDURE — 99396 PREV VISIT EST AGE 40-64: CPT | Mod: 25 | Performed by: INTERNAL MEDICINE

## 2025-04-16 PROCEDURE — 80053 COMPREHEN METABOLIC PANEL: CPT | Performed by: INTERNAL MEDICINE

## 2025-04-16 PROCEDURE — 80061 LIPID PANEL: CPT | Performed by: INTERNAL MEDICINE

## 2025-04-16 PROCEDURE — 90471 IMMUNIZATION ADMIN: CPT | Performed by: INTERNAL MEDICINE

## 2025-04-16 PROCEDURE — 83036 HEMOGLOBIN GLYCOSYLATED A1C: CPT | Performed by: INTERNAL MEDICINE

## 2025-04-16 ASSESSMENT — PAIN SCALES - GENERAL: PAINLEVEL_OUTOF10: NO PAIN (0)

## 2025-04-16 NOTE — PROGRESS NOTES
Prior to immunization administration, verified patients identity using patient s name and date of birth. Please see Immunization Activity for additional information.     Screening Questionnaire for Adult Immunization    Are you sick today?   No   Do you have allergies to medications, food, a vaccine component or latex?   No   Have you ever had a serious reaction after receiving a vaccination?   No   Do you have a long-term health problem with heart, lung, kidney, or metabolic disease (e.g., diabetes), asthma, a blood disorder, no spleen, complement component deficiency, a cochlear implant, or a spinal fluid leak?  Are you on long-term aspirin therapy?   No   Do you have cancer, leukemia, HIV/AIDS, or any other immune system problem?   No   Do you have a parent, brother, or sister with an immune system problem?   No   In the past 3 months, have you taken medications that affect  your immune system, such as prednisone, other steroids, or anticancer drugs; drugs for the treatment of rheumatoid arthritis, Crohn s disease, or psoriasis; or have you had radiation treatments?   No   Have you had a seizure, or a brain or other nervous system problem?   No   During the past year, have you received a transfusion of blood or blood    products, or been given immune (gamma) globulin or antiviral drug?   No   For women: Are you pregnant or is there a chance you could become       pregnant during the next month?   No   Have you received any vaccinations in the past 4 weeks?   No     Immunization questionnaire answers were all negative.      Patient instructed to remain in clinic for 15 minutes afterwards, and to report any adverse reactions.     Screening performed by Dolly Martinez RN on 4/16/2025 at 8:53 AM.

## 2025-04-16 NOTE — PROGRESS NOTES
Office Visit - Physical   Michael Darden   39 year old  male    Date of visit: 4/16/2025  Physician: Miguel Angel Jeffery MD     Assessment and Plan   1. Annual physical exam (Primary)  This is a healthy 39-year-old man with issues as discussed below.  Ongoing healthy lifestyle discussed and recommended.    2. Screening for prostate cancer  - Prostate Specific Antigen Screen; Future  - Prostate Specific Antigen Screen    3. Screening for diabetes mellitus  - Hemoglobin A1c; Future  - Hemoglobin A1c    4. Screening for hyperlipidemia  - Lipid panel reflex to direct LDL Fasting; Future  - Lipid panel reflex to direct LDL Fasting    5. Nodule of anterior chest wall  Most consistent with lipomas  - CBC with platelets; Future  - Comprehensive metabolic panel; Future  - US Soft Tissue Chest Wall or Upper Back; Future  - CBC with platelets  - Comprehensive metabolic panel      The following high BMI interventions were performed this visit: encouragement to exercise and lifestyle education regarding diet    Return in about 1 year (around 4/16/2026) for Routine preventive.     Chief Complaint   Chief Complaint   Patient presents with    Annual Visit     ANNUAL PREVENTATIVE CHECK UP        Patient Profile   Social History     Social History Narrative    Lives with his wife, Kelsy and five children, Ray (2012), Mckenna (2014), Annie (2017), Celio (2019) and Yue (2022).  From [place].  , Danielle  Kelsy is a therapist.          Past Medical History   There is no problem list on file for this patient.      Past Surgical History  He has a past surgical history that includes Septoplasty and vasectomy.     History of Present Illness   This 39 year old man comes in for annual visit.  Overall he is feeling well.  As previously discussed he has some nodules under his skin on the right lateral chest wall as well as left anterior chest wall.  These have been stable.    Review of Systems: A comprehensive review  "of systems was negative except as noted.     Medications and Allergies   No current outpatient medications on file.     Allergies   Allergen Reactions    Polytrim [Polymyxin B-Trimethoprim]      Worsening conjunctivitis with use        Family and Social History   Family History   Problem Relation Age of Onset    Breast Cancer Mother         metastatic    Hypertension Father     No Known Problems Brother     No Known Problems Daughter     No Known Problems Daughter     No Known Problems Daughter     No Known Problems Son     No Known Problems Son         Social History     Tobacco Use    Smoking status: Never     Passive exposure: Never    Smokeless tobacco: Never   Substance Use Topics    Alcohol use: Yes     Alcohol/week: 0.0 standard drinks of alcohol     Comment: 4 -5  drink a week    Drug use: No        Physical Exam   General Appearance:   No acute distress    /76 (BP Location: Left arm, Patient Position: Sitting, Cuff Size: Adult Regular)   Pulse 63   Temp 97.7  F (36.5  C) (Temporal)   Resp 14   Ht 1.981 m (6' 6\")   Wt 116.1 kg (256 lb)   SpO2 99%   BMI 29.58 kg/m      EYES: Eyelids, conjunctiva, and sclera were normal.   HEAD, EARS, NOSE, MOUTH, AND THROAT: Head and face were normal. Hearing was normal to voice and the ears were normal to external exam. Nose appearance was normal and there was no discharge.   NECK: Neck appearance was normal.   RESPIRATORY: Breathing pattern was normal and the chest moved symmetrically.   Lung sounds were normal and there were no abnormal sounds.  CARDIOVASCULAR: Heart rate and rhythm were normal.  S1 and S2 were normal and there were no extra sounds or murmurs.  There was no peripheral edema.  GASTROINTESTINAL: The abdomen was normal in contour.  Bowel sounds were present.  Percussion detected no organ enlargement or tenderness.  Palpation detected no tenderness, mass, or enlarged organs.   SKIN/HAIR/NAILS: Soft mobile subcutaneous nodules laterally over the " right chest wall and anteriorly over the left chest wall  NEUROLOGIC: The patient was alert and oriented to person, place, time, and circumstance. Speech was normal. Cranial nerves were normal. Motor strength was normal for age. The patient was normally coordinated.  PSYCHIATRIC:  Mood and affect were normal and the patient had normal recent and remote memory. The patient's judgment and insight were normal.       Additional Information   The longitudinal plan of care for the diagnosis(es)/condition(s) as documented were addressed during this visit. Due to the added complexity in care, I will continue to support Michael in the subsequent management and with ongoing continuity of care.       Miguel Angel Jeffery MD  Internal Medicine  Contact me at [phone]

## 2025-04-17 ENCOUNTER — HOSPITAL ENCOUNTER (OUTPATIENT)
Dept: ULTRASOUND IMAGING | Facility: CLINIC | Age: 40
Discharge: HOME | End: 2025-04-17
Attending: INTERNAL MEDICINE
Payer: COMMERCIAL

## 2025-04-17 DIAGNOSIS — R22.2 NODULE OF ANTERIOR CHEST WALL: ICD-10-CM

## 2025-04-17 PROCEDURE — 76604 US EXAM CHEST: CPT | Mod: 52

## (undated) RX ORDER — LIDOCAINE HYDROCHLORIDE 20 MG/ML
INJECTION, SOLUTION INFILTRATION; PERINEURAL
Status: DISPENSED
Start: 2024-06-06